# Patient Record
Sex: FEMALE | Race: WHITE | Employment: OTHER | URBAN - METROPOLITAN AREA
[De-identification: names, ages, dates, MRNs, and addresses within clinical notes are randomized per-mention and may not be internally consistent; named-entity substitution may affect disease eponyms.]

---

## 2017-11-02 ENCOUNTER — OFFICE VISIT (OUTPATIENT)
Dept: ORTHOPEDIC SURGERY | Age: 62
End: 2017-11-02

## 2017-11-02 VITALS
DIASTOLIC BLOOD PRESSURE: 69 MMHG | SYSTOLIC BLOOD PRESSURE: 146 MMHG | HEIGHT: 72 IN | TEMPERATURE: 98.3 F | OXYGEN SATURATION: 98 % | WEIGHT: 293 LBS | BODY MASS INDEX: 39.68 KG/M2 | RESPIRATION RATE: 18 BRPM | HEART RATE: 95 BPM

## 2017-11-02 DIAGNOSIS — Z98.890 HX OF LAMINECTOMY: ICD-10-CM

## 2017-11-02 DIAGNOSIS — M47.816 LUMBAR FACET ARTHROPATHY: ICD-10-CM

## 2017-11-02 DIAGNOSIS — M54.16 LUMBAR RADICULAR PAIN: Primary | ICD-10-CM

## 2017-11-02 DIAGNOSIS — M51.26 PROTRUDED LUMBAR DISC: ICD-10-CM

## 2017-11-02 RX ORDER — CARISOPRODOL 350 MG/1
TABLET ORAL
COMMUNITY
Start: 2017-10-31 | End: 2019-05-15

## 2017-11-02 RX ORDER — MELOXICAM 15 MG/1
TABLET ORAL
COMMUNITY
Start: 2017-08-12 | End: 2019-10-10

## 2017-11-02 RX ORDER — NAPROXEN AND ESOMEPRAZOLE MAGNESIUM 500; 20 MG/1; MG/1
TABLET, DELAYED RELEASE ORAL
COMMUNITY
End: 2019-04-11 | Stop reason: ALTCHOICE

## 2017-11-02 RX ORDER — HYDROCHLOROTHIAZIDE 25 MG/1
TABLET ORAL DAILY
COMMUNITY
Start: 2017-08-12 | End: 2020-02-07

## 2017-11-02 RX ORDER — GABAPENTIN 300 MG/1
CAPSULE ORAL
Qty: 90 CAP | Refills: 1 | Status: SHIPPED | OUTPATIENT
Start: 2017-11-02 | End: 2017-12-11 | Stop reason: SDUPTHER

## 2017-11-02 NOTE — PROGRESS NOTES
VORB ENTERED PER DR. Arnel Spencer AS DOCUMENTED ON BLUE SHEET: Gabapentin 300 mg Take 1 tab PO QHS x3 nights then increase BID x3d then TID Disp 90 1 RF

## 2017-11-02 NOTE — PATIENT INSTRUCTIONS
Back Care and Preventing Injuries: Care Instructions  Your Care Instructions    You can hurt your back doing many everyday activities: lifting a heavy box, bending down to garden, exercising at the gym, and even getting out of bed. But you can keep your back strong and healthy by doing some exercises. You also can follow a few tips for sitting, sleeping, and lifting to avoid hurting your back again. Talk to your doctor before you start an exercise program. Ask for help if you want to learn more about keeping your back healthy. Follow-up care is a key part of your treatment and safety. Be sure to make and go to all appointments, and call your doctor if you are having problems. It's also a good idea to know your test results and keep a list of the medicines you take. How can you care for yourself at home? · Stay at a healthy weight to avoid strain on your lower back. · Do not smoke. Smoking increases the risk of osteoporosis, which weakens the spine. If you need help quitting, talk to your doctor about stop-smoking programs and medicines. These can increase your chances of quitting for good. · Make sure you sleep in a position that maintains your back's normal curves and on a mattress that feels comfortable. Sleep on your side with a pillow between your knees, or sleep on your back with a pillow under your knees. These positions can reduce strain on your back. · When you get out of bed, lie on your side and bend both knees. Drop your feet over the edge of the bed as you push up with both arms. Scoot to the edge of the bed. Make sure your feet are in line with your rear end (buttocks), and then stand up. · If you must stand for a long time, put one foot on a stool, ledge, or box. Exercise to strengthen your back and other muscles  · Get at least 30 minutes of exercise on most days of the week. Walking is a good choice.  You also may want to do other activities, such as running, swimming, cycling, or playing tennis or team sports. · Stretch your back muscles. Here are few exercises to try:  Mia Jones on your back with your knees bent and your feet flat on the floor. Gently pull one bent knee to your chest. Put that foot back on the floor, and then pull the other knee to your chest. Hold for 15 to 30 seconds. Repeat 2 to 4 times. ¨ Do pelvic tilts. Lie on your back with your knees bent. Tighten your stomach muscles. Pull your belly button (navel) in and up toward your ribs. You should feel like your back is pressing to the floor and your hips and pelvis are slightly lifting off the floor. Hold for 6 seconds while breathing smoothly. · Keep your core muscles strong. The muscles of your back, belly (abdomen), and buttocks support your spine. ¨ Pull in your belly, and imagine pulling your navel toward your spine. Hold this for 6 seconds, then relax. Remember to keep breathing normally as you tense your muscles. ¨ Do curl-ups. Always do them with your knees bent. Keep your low back on the floor, and curl your shoulders toward your knees using a smooth, slow motion. Keep your arms folded across your chest. If this bothers your neck, try putting your hands behind your neck (not your head), with your elbows spread apart. ¨ Lie on your back with your knees bent and your feet flat on the floor. Tighten your belly muscles, and then push with your feet and raise your buttocks up a few inches. Hold this position 6 seconds as you continue to breathe normally, then lower yourself slowly to the floor. Repeat 8 to 12 times. ¨ If you like group exercise, try Pilates or yoga. These classes have poses that strengthen the core muscles. Protect your back when you sit  · Place a small pillow, a rolled-up towel, or a lumbar roll in the curve of your back if you need extra support. · Sit in a chair that is low enough to let you place both feet flat on the floor with both knees nearly level with your hips.  If your chair or desk is too high, use a foot rest to raise your knees. · When driving, keep your knees nearly level with your hips. Sit straight, and drive with both hands on the steering wheel. Your arms should be in a slightly bent position. · Try a kneeling chair, which helps tilt your hips forward. This takes pressure off your lower back. · Try sitting on an exercise ball. It can rock from side to side, which helps keep your back loose. Lift properly  · Squat down, bending at the hips and knees only. If you need to, put one knee to the floor and extend your other knee in front of you, bent at a right angle (half kneeling). · Press your chest straight forward. This helps keep your upper back straight while keeping a slight arch in your low back. · Hold the load as close to your body as possible, at the level of your navel. · Use your feet to change direction, taking small steps. · Lead with your hips as you change direction. Keep your shoulders in line with your hips as you move. Do not twist your body. · Set down your load carefully, squatting with your knees and hips only. When should you call for help? Watch closely for changes in your health, and be sure to contact your doctor if you have any problems. Where can you learn more? Go to http://jv-merly.info/. Enter S810 in the search box to learn more about \"Back Care and Preventing Injuries: Care Instructions. \"  Current as of: March 21, 2017  Content Version: 11.4  © 2547-2354 [employer]. Care instructions adapted under license by [employer] (which disclaims liability or warranty for this information). If you have questions about a medical condition or this instruction, always ask your healthcare professional. [place] [address] any warranty or liability for your use of this information.

## 2017-11-02 NOTE — PROGRESS NOTES
[facility] [address], [address]  [place]  Phone: [phone]  Fax: [phone]        Lexie Sanchez  : 1955  PCP: Walter Booker MD      NEW PATIENT      ASSESSMENT AND PLAN     Diagnoses and all orders for this visit:    1. Lumbar radicular pain, R>L    2. Protruded lumbar disc, LL5/S1    3. Lumbar facet arthropathy, L3/4    4. Hx of laminectomy, LL4/5 Dr. Daniel Crenshaw    Other orders  -     gabapentin (NEURONTIN) 300 mg capsule; Take 1 tab PO QHS x 3 nights then increase to BID x 3 days then TID       1. Advised to stay active as tolerated. 2. Discussed medications and cortisone injections as treatment options  3. Start Gabapentin 300mg ramp q3days; consider taper after several months  4. Continue rest of your medications. Be careful of increased swelling  5. Avoid combination of bending, twisting, lifting. 6. Information on avoiding back injury provided    Follow-up Disposition:  Return in about 4 weeks (around 2017). CHIEF COMPLAINT  Chadwick Meigs is seen today in consultation at the request of Dr. Radha Padilla for complaints of low back pain       HISTORY OF PRESENT ILLNESS  Chadwick Meigs is a 60 y.o. female. Today pt c/o low back pain of 6 weeks duration. Pt denies any specific incident or injury that caused their pain. Reports pain started on RIGHT side after prolonged sitting on a bar stool. Used MDP rx by rheumatologist, that relieved the pain significantly. She reports last dose MDP couple weeks ago. She notes worsening of pain in low back. She also notes some lateral leg pain bilaterally (LEFT>RIGHT) especially at night that has become intermittent, with neuropathic pain down into feet. She denies any known hx of neuropathic pain. She reports she has been having cortisone shots [id number] on her left knee. Pt admits to some weakness and heaviness with walking.  She reports only being able to walk for a few minutes before stopping. She reports having trouble standing at sink due to back pain. Pt denies saddle paresthesias. She has tried; PT-No,  Non-opioid medications Yes, and spinal injections No.  She reports the night-time symptoms and severity of her back pain are new. She also reports that she has had gabapentin in the past and was effective; she denies any grogginess. Denies persistent fevers, chills, weight changes, neurogenic bowel or bladder symptoms. Pt denies recent ED visits or hospitalizations. Has hx of back surgery by Dr. Chidi Grant in 2008 for ruptured disk. Recent MRI done and images reviewed with patient. Contralateral disc protrusion at L5/S1. Retired  - is currently substituting    PCP Valentine Hopkins        Pain Assessment  11/2/2017   Location of Pain Back   Severity of Pain 5   Quality of Pain Aching   Quality of Pain Comment numbness, tingling   Frequency of Pain Intermittent   Aggravating Factors Standing;Walking;Bending   Aggravating Factors Comment lifting, changing positions, Lying,sitting     MRI 10/04/2017 AT [facility]  IMPRESSION:   1. Status post left hemilaminectomy at L4-L5  2. Mild to moderate multilevel lumbar spondylosis. Mild spinal canal stenosis at L3-L4 due to disc bulge and severe facet arthropathy. Mild mass effect on the lateral recess at L3-L4 and possible impingement of the descending left L4 nerve root due to disc herniation and facet arthropathy  3. Mild left neural foraminal narrowing at L5-S1 due ot left foraminal disc osteophyte complex. Large far left lateral disc osteophyte complex at L5-S1 abuts and possible impinges the exiting post foraminal left L5 nerve root. 4. Mild to moderate degenerative change of the bialteral sacroiliac joints.     PAST MEDICAL HISTORY   Past Medical History:   Diagnosis Date    Arthritis     Chronic pain     Hypertension        Past Surgical History:   Procedure Laterality Date    HX GYN      HX LUMBAR LAMINECTOMY Left     Laminectomy L4/5 Dr. Moses Blanco about 2008       MEDICATIONS      Current Outpatient Prescriptions   Medication Sig Dispense Refill    carisoprodol (SOMA) 350 mg tablet       hydroCHLOROthiazide (HYDRODIURIL) 25 mg tablet       meloxicam (MOBIC) 15 mg tablet       Naproxen-Esomeprazole Mag (VIMOVO) 500-20 mg TbID Take  by mouth.  gabapentin (NEURONTIN) 300 mg capsule Take 1 tab PO QHS x 3 nights then increase to BID x 3 days then TID 90 Cap 1    simvastatin (ZOCOR) 40 mg tablet Take 40 mg by mouth nightly. Indications: HYPERLIPIDEMIA      metoprolol tartrate (LOPRESSOR) 25 mg tablet Take 25 mg by mouth daily. 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 Indications: HYPERTENSION         ALLERGIES    Allergies   Allergen Reactions    Indocin [Indomethacin] Rash          SOCIAL HISTORY    Social History     Social History    Marital status:      Spouse name: N/A    Number of children: N/A    Years of education: N/A     Occupational History    Not on file. Social History Main Topics    Smoking status: Former Smoker    Smokeless tobacco: Former User    Alcohol use Yes      Comment: Occasionally    Drug use: No    Sexual activity: Not on file     Other Topics Concern    Not on file     Social History Narrative    No narrative on file       FAMILY HISTORY    Family History   Problem Relation Age of Onset    Hypertension Mother     Hypertension Father     Stroke Father     Hypertension Sister     Cancer Sister     Hypertension Brother     Alcohol abuse Brother     Arthritis-osteo Brother     Heart Disease Brother          REVIEW OF SYSTEMS  Review of Systems   Constitutional: Negative for chills, fever and weight loss. Respiratory: Negative for shortness of breath. Cardiovascular: Negative for chest pain. Gastrointestinal: Negative for constipation. Negative for fecal incontinence   Genitourinary: Negative for dysuria. Negative for urinary incontinence   Musculoskeletal:        Per HPI   Skin: Negative for rash. Neurological: Positive for tingling. Negative for dizziness, tremors, focal weakness and headaches. Endo/Heme/Allergies: Does not bruise/bleed easily. Psychiatric/Behavioral: The patient does not have insomnia. PHYSICAL EXAMINATION  Visit Vitals    /69    Pulse 95    Temp 98.3 °F (36.8 °C) (Oral)    Resp 18    Ht 6' (1.829 m)    Wt 296 lb (134.3 kg)    SpO2 98%    BMI 40.14 kg/m2          Accompanied by self. Constitutional:  Well developed, well nourished, in no acute distress. Psychiatric: Affect and mood are appropriate. Integumentary: No rashes or abrasions noted on exposed areas. Cardiovascular/Peripheral Vascular: Intact l pulses. No peripheral edema is noted. Lymphatic:  1+ bilateral pitting edema. No cervical lymphadenopathy. SPINE/MUSCULOSKELETAL EXAM    Lumbar spine:  No rash, ecchymosis, or gross obliquity. No fasciculations. No focal atrophy is noted. Tenderness to palpation L4-L5 paraspinals bilaterally, Tenderness to palpation of midline lumbar spine. No tenderness to palpation at the sciatic notch. SI joints non-tender. Trochanters non tender. MOTOR:       Hip Flex  Quads Hamstrings Ankle DF EHL Ankle PF   Right +4/5 +4/5 +4/5 +4/5 +4/5 +4/5   Left +4/5 +4/5 +4/5 +4/5 +4/5 +4/5       Straight Leg raise negative. Toe rise unable to do due to loss of balance    Ambulation without assistive device. Wide base of support. Written by Edmundo Mariano, as dictated by Precious Syed MD.    I, Dr. Precious Syed MD, confirm that all documentation is accurate. Ms. Tarik Trevino may have a reminder for a \"due or due soon\" health maintenance. I have asked that she contact her primary care provider for follow-up on this health maintenance.

## 2017-11-02 NOTE — MR AVS SNAPSHOT
Visit Information Date & Time Provider Department Dept. Phone Encounter #  
 11/2/2017  9:30 AM Tarik Levi MD [facility] [phone] [id number] Follow-up Instructions Return in about 4 weeks (around 11/30/2017). Upcoming Health Maintenance Date Due Hepatitis C Screening 1955 DTaP/Tdap/Td series (1 - Tdap) 6/27/1975 PAP AKA CERVICAL CYTOLOGY 6/27/1975 BREAST CANCER SCRN MAMMOGRAM 6/27/2004 FOBT Q 1 YEAR AGE 50-75 6/27/2004 ZOSTER VACCINE AGE 60> 4/27/2014 INFLUENZA AGE 9 TO ADULT 8/1/2017 Allergies as of 11/2/2017  Review Complete On: 11/2/2017 By: Tarik Levi MD  
  
 Severity Noted Reaction Type Reactions Indocin [Indomethacin]  05/10/2016   Systemic Rash Current Immunizations  Never Reviewed No immunizations on file. Not reviewed this visit Vitals BP Pulse Temp Resp Height(growth percentile) Weight(growth percentile) 146/69 95 98.3 °F (36.8 °C) (Oral) 18 6' (1.829 m) 296 lb (134.3 kg) SpO2 BMI Smoking Status 98% 40.14 kg/m2 Former Smoker BMI and BSA Data Body Mass Index Body Surface Area  
 40.14 kg/m 2 2.61 m 2 Preferred Pharmacy Pharmacy Name Phone [address] [facility], [address] [place] [address] [phone] Your Updated Medication List  
  
   
This list is accurate as of: 11/2/17 10:43 AM.  Always use your most recent med list.  
  
  
  
  
 carisoprodol 350 mg tablet Commonly known as:  SOMA  
  
 gabapentin 300 mg capsule Commonly known as:  NEURONTIN Take 1 tab PO QHS x 3 nights then increase to BID x 3 days then TID  
  
 hydroCHLOROthiazide 25 mg tablet Commonly known as:  HYDRODIURIL  
  
 lisinopril 10 mg tablet Commonly known as:  Tomasz Economy Take 10 mg by mouth daily. Indications: HYPERTENSION  
  
 meloxicam 15 mg tablet Commonly known as:  MOBIC  
  
 metoprolol tartrate 25 mg tablet Commonly known as:  LOPRESSOR Take 25 mg by mouth daily. 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  
  
 simvastatin 40 mg tablet Commonly known as:  ZOCOR Take 40 mg by mouth nightly. Indications: HYPERLIPIDEMIA  
  
 VIMOVO 500-20 mg Tbid Generic drug:  Naproxen-Esomeprazole Mag Take  by mouth. Prescriptions Printed Refills  
 gabapentin (NEURONTIN) 300 mg capsule 1 Sig: Take 1 tab PO QHS x 3 nights then increase to BID x 3 days then TID Class: Print Follow-up Instructions Return in about 4 weeks (around 11/30/2017). Patient Instructions Back Care and Preventing Injuries: Care Instructions Your Care Instructions You can hurt your back doing many everyday activities: lifting a heavy box, bending down to garden, exercising at the gym, and even getting out of bed. But you can keep your back strong and healthy by doing some exercises. You also can follow a few tips for sitting, sleeping, and lifting to avoid hurting your back again. Talk to your doctor before you start an exercise program. Ask for help if you want to learn more about keeping your back healthy. Follow-up care is a key part of your treatment and safety. Be sure to make and go to all appointments, and call your doctor if you are having problems. It's also a good idea to know your test results and keep a list of the medicines you take. How can you care for yourself at home? · Stay at a healthy weight to avoid strain on your lower back. · Do not smoke. Smoking increases the risk of osteoporosis, which weakens the spine. If you need help quitting, talk to your doctor about stop-smoking programs and medicines. These can increase your chances of quitting for good.  
· Make sure you sleep in a position that maintains your back's normal curves and on a mattress that feels comfortable. Sleep on your side with a pillow between your knees, or sleep on your back with a pillow under your knees. These positions can reduce strain on your back. · When you get out of bed, lie on your side and bend both knees. Drop your feet over the edge of the bed as you push up with both arms. Scoot to the edge of the bed. Make sure your feet are in line with your rear end (buttocks), and then stand up. · If you must stand for a long time, put one foot on a stool, ledge, or box. Exercise to strengthen your back and other muscles · Get at least 30 minutes of exercise on most days of the week. Walking is a good choice. You also may want to do other activities, such as running, swimming, cycling, or playing tennis or team sports. · Stretch your back muscles. Here are few exercises to try: ¨ Lie on your back with your knees bent and your feet flat on the floor. Gently pull one bent knee to your chest. Put that foot back on the floor, and then pull the other knee to your chest. Hold for 15 to 30 seconds. Repeat 2 to 4 times. ¨ Do pelvic tilts. Lie on your back with your knees bent. Tighten your stomach muscles. Pull your belly button (navel) in and up toward your ribs. You should feel like your back is pressing to the floor and your hips and pelvis are slightly lifting off the floor. Hold for 6 seconds while breathing smoothly. · Keep your core muscles strong. The muscles of your back, belly (abdomen), and buttocks support your spine. ¨ Pull in your belly, and imagine pulling your navel toward your spine. Hold this for 6 seconds, then relax. Remember to keep breathing normally as you tense your muscles. ¨ Do curl-ups. Always do them with your knees bent. Keep your low back on the floor, and curl your shoulders toward your knees using a smooth, slow motion.  Keep your arms folded across your chest. If this bothers your neck, try putting your hands behind your neck (not your head), with your elbows spread apart. ¨ Lie on your back with your knees bent and your feet flat on the floor. Tighten your belly muscles, and then push with your feet and raise your buttocks up a few inches. Hold this position 6 seconds as you continue to breathe normally, then lower yourself slowly to the floor. Repeat 8 to 12 times. ¨ If you like group exercise, try Pilates or yoga. These classes have poses that strengthen the core muscles. Protect your back when you sit · Place a small pillow, a rolled-up towel, or a lumbar roll in the curve of your back if you need extra support. · Sit in a chair that is low enough to let you place both feet flat on the floor with both knees nearly level with your hips. If your chair or desk is too high, use a foot rest to raise your knees. · When driving, keep your knees nearly level with your hips. Sit straight, and drive with both hands on the steering wheel. Your arms should be in a slightly bent position. · Try a kneeling chair, which helps tilt your hips forward. This takes pressure off your lower back. · Try sitting on an exercise ball. It can rock from side to side, which helps keep your back loose. Lift properly · Squat down, bending at the hips and knees only. If you need to, put one knee to the floor and extend your other knee in front of you, bent at a right angle (half kneeling). · Press your chest straight forward. This helps keep your upper back straight while keeping a slight arch in your low back. · Hold the load as close to your body as possible, at the level of your navel. · Use your feet to change direction, taking small steps. · Lead with your hips as you change direction. Keep your shoulders in line with your hips as you move. Do not twist your body. · Set down your load carefully, squatting with your knees and hips only. When should you call for help? Watch closely for changes in your health, and be sure to contact your doctor if you have any problems. Where can you learn more? Go to http://jv-merly.info/. Enter S810 in the search box to learn more about \"Back Care and Preventing Injuries: Care Instructions. \" Current as of: March 21, 2017 Content Version: 11.4 © 5373-1706 [employer]. Care instructions adapted under license by [employer] (which disclaims liability or warranty for this information). If you have questions about a medical condition or this instruction, always ask your healthcare professional. [address] any warranty or liability for your use of this information. Introducing [facility]! Blanca Mathews introduces Naytev patient portal. Now you can access parts of your medical record, email your doctor's office, and request medication refills online. 1. In your internet browser, go to [email or web address] 2. Click on the First Time User? Click Here link in the Sign In box. You will see the New Member Sign Up page. 3. Enter your Naytev Access Code exactly as it appears below. You will not need to use this code after youve completed the sign-up process. If you do not sign up before the expiration date, you must request a new code. · Naytev Access Code: [email or web address] Expires: 1/31/2018  8:49 AM 
 
4. Enter the last four digits of your Social Security Number (xxxx) and Date of Birth (mm/dd/yyyy) as indicated and click Submit. You will be taken to the next sign-up page. 5. Create a Quisict ID. This will be your Naytev login ID and cannot be changed, so think of one that is secure and easy to remember. 6. Create a Naytev password. You can change your password at any time. 7. Enter your Password Reset Question and Answer. This can be used at a later time if you forget your password. 8. Enter your e-mail address. You will receive e-mail notification when new information is available in [address] 9. Click Sign Up. You can now view and download portions of your medical record. 10. Click the Download Summary menu link to download a portable copy of your medical information. If you have questions, please visit the Frequently Asked Questions section of the [employer] website. Remember, [employer] is NOT to be used for urgent needs. For medical emergencies, dial 911. Now available from your iPhone and Android! Please provide this summary of care documentation to your next provider. Your primary care clinician is listed as Phys Other. If you have any questions after today's visit, please call [phone].

## 2017-11-03 PROBLEM — M54.16 LUMBAR RADICULAR PAIN: Status: ACTIVE | Noted: 2017-11-03

## 2017-11-03 PROBLEM — M51.26 PROTRUDED LUMBAR DISC: Status: ACTIVE | Noted: 2017-11-03

## 2017-11-03 PROBLEM — M47.816 LUMBAR FACET ARTHROPATHY: Status: ACTIVE | Noted: 2017-11-03

## 2017-11-03 PROBLEM — Z98.890 HX OF LAMINECTOMY: Status: ACTIVE | Noted: 2017-11-03

## 2017-11-16 ENCOUNTER — TELEPHONE (OUTPATIENT)
Dept: ORTHOPEDIC SURGERY | Age: 62
End: 2017-11-16

## 2017-11-16 NOTE — TELEPHONE ENCOUNTER
Patient called in states that she needs her office note from 11/02/17 faxed over to her Rheumatologist Ling Ramos. She has a telephone number but no fax. Tel#[phone].  Patient also states she has signed medical release already and can be reached at [phone]

## 2017-12-11 RX ORDER — CARISOPRODOL 350 MG/1
TABLET ORAL
Status: CANCELLED | OUTPATIENT
Start: 2017-12-11

## 2017-12-11 RX ORDER — GABAPENTIN 300 MG/1
300 CAPSULE ORAL 3 TIMES DAILY
Qty: 270 CAP | Refills: 0 | Status: SHIPPED | OUTPATIENT
Start: 2017-12-11 | End: 2018-02-12 | Stop reason: SDUPTHER

## 2017-12-11 NOTE — TELEPHONE ENCOUNTER
The patient requests 90 day prescription for Neurontin and Soma to be filled using [facility]. Please advise. Last Visit: 11/02/2017 with MD Sunil Palafox    Next Appointment: 12/14/2017 with MD Barber Most     Requested Prescriptions     Pending Prescriptions Disp Refills    gabapentin (NEURONTIN) 300 mg capsule 270 Cap 0     Sig: Take 1 Cap by mouth three (3) times daily.     carisoprodol (SOMA) 350 mg tablet

## 2017-12-14 ENCOUNTER — OFFICE VISIT (OUTPATIENT)
Dept: ORTHOPEDIC SURGERY | Age: 62
End: 2017-12-14

## 2017-12-14 VITALS
WEIGHT: 293 LBS | TEMPERATURE: 98.4 F | DIASTOLIC BLOOD PRESSURE: 59 MMHG | HEIGHT: 72 IN | SYSTOLIC BLOOD PRESSURE: 129 MMHG | BODY MASS INDEX: 39.68 KG/M2 | OXYGEN SATURATION: 96 % | HEART RATE: 83 BPM | RESPIRATION RATE: 18 BRPM

## 2017-12-14 DIAGNOSIS — M54.16 LUMBAR RADICULAR PAIN: ICD-10-CM

## 2017-12-14 DIAGNOSIS — M47.816 LUMBAR FACET ARTHROPATHY: Primary | ICD-10-CM

## 2017-12-14 DIAGNOSIS — Z98.890 HX OF LAMINECTOMY: ICD-10-CM

## 2017-12-14 PROBLEM — E66.01 OBESITY, MORBID (HCC): Status: ACTIVE | Noted: 2017-12-14

## 2017-12-14 RX ORDER — METHYLPREDNISOLONE 4 MG/1
TABLET ORAL
Refills: 0 | COMMUNITY
Start: 2017-09-07 | End: 2018-08-01 | Stop reason: ALTCHOICE

## 2017-12-14 RX ORDER — CYCLOBENZAPRINE HCL 10 MG
TABLET ORAL
Refills: 0 | COMMUNITY
Start: 2017-10-06 | End: 2019-05-15

## 2017-12-14 RX ORDER — SODIUM PICOSULFATE, MAGNESIUM OXIDE, AND ANHYDROUS CITRIC ACID 10; 3.5; 12 MG/16.1G; G/16.1G; G/16.1G
POWDER, METERED ORAL
Refills: 0 | COMMUNITY
Start: 2017-09-18 | End: 2019-10-10

## 2017-12-14 RX ORDER — AMITRIPTYLINE HYDROCHLORIDE 10 MG/1
TABLET, FILM COATED ORAL
Qty: 60 TAB | Refills: 0 | Status: SHIPPED | OUTPATIENT
Start: 2017-12-14 | End: 2018-01-19 | Stop reason: SDUPTHER

## 2017-12-14 RX ORDER — PREDNISONE 10 MG/1
TABLET ORAL
Refills: 0 | COMMUNITY
Start: 2017-09-25 | End: 2019-10-10

## 2017-12-14 RX ORDER — METHYLPREDNISOLONE 4 MG/1
TABLET ORAL
Qty: 1 DOSE PACK | Refills: 0 | Status: SHIPPED | OUTPATIENT
Start: 2017-12-14 | End: 2018-08-01 | Stop reason: ALTCHOICE

## 2017-12-14 RX ORDER — HYDROCODONE BITARTRATE AND ACETAMINOPHEN 5; 325 MG/1; MG/1
TABLET ORAL
Refills: 0 | COMMUNITY
Start: 2017-10-12 | End: 2019-10-10

## 2017-12-14 NOTE — PROGRESS NOTES
[address], [address]  [place]  Phone: [phone]  Fax: [phone]        Hai Espinoza  : 1955  PCP: Gabriella Major MD    PROGRESS NOTE      ASSESSMENT AND PLAN    Diagnoses and all orders for this visit:    1. Lumbar facet arthropathy, L3/4    2. Lumbar radicular pain, left    3. Hx of laminectomy, LL4/5 Dr. Sterling Jerome    Other orders  -     methylPREDNISolone (MEDROL DOSEPACK) 4 mg tablet; Take as directed. -     amitriptyline (ELAVIL) 10 mg tablet; Take 1 tab PO QHS X3 nights then increase to 2 tabs PO QHS    1. Advised to continue HEP. 2. Rx for MDP. 3. DC Gabapentin. Take two tabs QHS x 3 days then decrease to 1 tab QHS x 3 days then stop. 4. Trial of Elavil. Follow-up Disposition:  Return in about 6 weeks (around 2018). HISTORY OF PRESENT ILLNESS  Lucia Packer is a 60 y.o. female. Pt presents to the office for a f/u visit for back pain. She was given a trial of Gabapentin at last OV. She was having good relief from her Gabapentin trial initially but is starting to have decrease of benefit from Gabapentin as she is starting to feel pain into her LLE into her ankle. If she lays on her back at night she has RLE and LLE pain. Her pain is usually felt in the LLE. She has a very short standing and walking tolerance. She begins to feel \"shaky\" when standing in stationary position. She tries to walk to her mailbox every day but has radiating pain with this. Pt has weakness in LLE. Pt denies saddle paresthesias. Pt states she has been using Gabapentin 900 mg QHS without much relief. Denies persistent fevers, chills, weight changes, neurogenic bowel or bladder symptoms. Pt denies recent ED visits or hospitalizations. She states that the weakness in her LLE is chronic, had foot drop before her surgery. Pt had L L4-5 laminectomy by Dr. Sterling Jerome in .  Had to have blood patch post spinal injection in the past.     Pain Assessment  12/14/2017   Location of Pain Back   Severity of Pain 3   Quality of Pain Aching   Quality of Pain Comment numbness, tingling   Frequency of Pain Constant   Aggravating Factors (No Data)   Aggravating Factors Comment moving around, sitting/lying down to long   Relieving Factors (No Data)   Relieving Factors Comment Muscle Relaxer       PAST MEDICAL HISTORY   Past Medical History:   Diagnosis Date    Arthritis     Chronic pain     Hypertension        Past Surgical History:   Procedure Laterality Date    HX GYN      HX LUMBAR LAMINECTOMY Left     Laminectomy L4/5 Dr. Fiona Acosta about 2008   . MEDICATIONS    Current Outpatient Prescriptions   Medication Sig Dispense Refill    cyclobenzaprine (FLEXERIL) 10 mg tablet TK 1/2 TO 1 T PO HS PRN  0    HYDROcodone-acetaminophen (NORCO) 5-325 mg per tablet TK 1 T PO BID PRN  0    methylPREDNISolone (MEDROL DOSEPACK) 4 mg tablet TK UTD WITH FOOD FOR 6 DAYS  0    predniSONE (DELTASONE) 10 mg tablet   0    PREPOPIK 10 mg-3.5 gram-12 gram pwpk USE AS DIRECTED  0    carisoprodol (SOMA) 350 mg tablet       hydroCHLOROthiazide (HYDRODIURIL) 25 mg tablet       meloxicam (MOBIC) 15 mg tablet       Naproxen-Esomeprazole Mag (VIMOVO) 500-20 mg TbID Take  by mouth.  simvastatin (ZOCOR) 40 mg tablet Take 40 mg by mouth nightly. Indications: HYPERLIPIDEMIA      metoprolol tartrate (LOPRESSOR) 25 mg tablet Take 25 mg by mouth daily. 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 Indications: HYPERTENSION      gabapentin (NEURONTIN) 300 mg capsule Take 1 Cap by mouth three (3) times daily.  270 Cap 0        ALLERGIES  Allergies   Allergen Reactions    Indocin [Indomethacin] Rash          SOCIAL HISTORY    Social History     Social History    Marital status:      Spouse name: N/A    Number of children: N/A    Years of education: N/A Occupational History    Not on file. Social History Main Topics    Smoking status: Former Smoker    Smokeless tobacco: Former User    Alcohol use Yes      Comment: Occasionally    Drug use: No    Sexual activity: Not on file     Other Topics Concern    Not on file     Social History Narrative       FAMILY HISTORY  Family History   Problem Relation Age of Onset    Hypertension Mother     Hypertension Father     Stroke Father     Hypertension Sister     Cancer Sister     Hypertension Brother     Alcohol abuse Brother     Arthritis-osteo Brother     Heart Disease Brother        REVIEW OF SYSTEMS  Review of Systems   Constitutional: Negative for chills, fever and weight loss. Respiratory: Negative for shortness of breath. Cardiovascular: Negative for chest pain. Gastrointestinal: Negative for constipation. Negative for fecal incontinence   Genitourinary: Negative for dysuria. Negative for urinary incontinence   Musculoskeletal:        Per HPI   Skin: Negative for rash. Neurological: Positive for focal weakness. Negative for dizziness, tingling, tremors and headaches. Endo/Heme/Allergies: Does not bruise/bleed easily. Psychiatric/Behavioral: The patient does not have insomnia. PHYSICAL EXAMINATION  Visit Vitals    /59    Pulse 83    Temp 98.4 °F (36.9 °C) (Oral)    Resp 18    Ht 6' (1.829 m)    Wt 295 lb 12.8 oz (134.2 kg)    SpO2 96%    BMI 40.12 kg/m2         Accompanied by self. Constitutional:  Well developed, well nourished, in no acute distress. Psychiatric: Affect and mood are appropriate. Integumentary: No rashes or abrasions noted on exposed areas. Cardiovascular/Peripheral Vascular: Intact l pulses. No peripheral edema is noted. Lymphatic:  No evidence of lymphedema. No cervical lymphadenopathy. SPINE/MUSCULOSKELETAL EXAM      Lumbar spine:  No rash, ecchymosis, or gross obliquity. No fasciculations.  No focal atrophy is noted.   Tenderness to palpation R L4-5. Tenderness to palpation of L L5-S1. No tenderness to palpation at the sciatic notch. SI joints non-tender. Trochanters non tender. MOTOR:       Hip Flex  Quads Hamstrings Ankle DF EHL Ankle PF   Right +4/5 +4/5 +4/5 +4/5 +4/5 +4/5   Left +4/5 +4/5 +4/5 4/5 +4/5 +4/5     Straight leg raise is + L 90 degrees. Ambulation without assistive device. FWB. Written by Yecenia Sosa, as dictated by Louise Anderson MD.    I, Dr. Louise Anderson MD, confirm that all documentation is accurate. Ms. Nacho Zavala may have a reminder for a \"due or due soon\" health maintenance. I have asked that she contact her primary care provider for follow-up on this health maintenance.

## 2017-12-14 NOTE — MR AVS SNAPSHOT
Visit Information Date & Time Provider Department Dept. Phone Encounter #  
 12/14/2017  1:15 PM Ajay Edmonds MD [facility] [address] [facility] [phone] [id number] Follow-up Instructions Return in about 1 month (around 1/14/2018). Upcoming Health Maintenance Date Due Hepatitis C Screening 1955 DTaP/Tdap/Td series (1 - Tdap) 6/27/1975 PAP AKA CERVICAL CYTOLOGY 6/27/1975 FOBT Q 1 YEAR AGE 50-75 6/27/2004 ZOSTER VACCINE AGE 60> 4/27/2014 Influenza Age 5 to Adult 8/1/2017 Allergies as of 12/14/2017  Review Complete On: 12/14/2017 By: Herve Mckeon LPN Severity Noted Reaction Type Reactions Indocin [Indomethacin]  05/10/2016   Systemic Rash Current Immunizations  Never Reviewed No immunizations on file. Not reviewed this visit You Were Diagnosed With   
  
 Codes Comments Lumbar facet arthropathy    -  Primary ICD-10-CM: M12.88 ICD-9-CM: 721.3 Lumbar radicular pain     ICD-10-CM: M54.16 
ICD-9-CM: 724.4 Hx of laminectomy     ICD-10-CM: Z98.890 ICD-9-CM: V45.89 Vitals BP Pulse Temp Resp Height(growth percentile) Weight(growth percentile) 129/59 83 98.4 °F (36.9 °C) (Oral) 18 6' (1.829 m) 295 lb 12.8 oz (134.2 kg) SpO2 BMI Smoking Status 96% 40.12 kg/m2 Former Smoker BMI and BSA Data Body Mass Index Body Surface Area  
 40.12 kg/m 2 2.61 m 2 Preferred Pharmacy Pharmacy Name [address] [facility] [phone] Your Updated Medication List  
  
   
This list is accurate as of: 12/14/17  2:29 PM.  Always use your most recent med list.  
  
  
  
  
 carisoprodol 350 mg tablet Commonly known as:  SOMA cyclobenzaprine 10 mg tablet Commonly known as:  FLEXERIL TK 1/2 TO 1 T PO HS PRN  
  
 gabapentin 300 mg capsule Commonly known as:  NEURONTIN  
 Take 1 Cap by mouth three (3) times daily. hydroCHLOROthiazide 25 mg tablet Commonly known as:  HYDRODIURIL HYDROcodone-acetaminophen 5-325 mg per tablet Commonly known as:  [address] TK 1 T PO BID PRN  
  
 lisinopril 10 mg tablet Commonly known as:  Merilynn [place] Take 10 mg by mouth daily. Indications: HYPERTENSION  
  
 meloxicam 15 mg tablet Commonly known as:  MOBIC  
  
 methylPREDNISolone 4 mg tablet Commonly known as:  Harmony Oh TK UTD WITH FOOD FOR 6 DAYS  
  
 metoprolol tartrate 25 mg tablet Commonly known as:  LOPRESSOR Take 25 mg by mouth daily. 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  
  
 predniSONE 10 mg tablet Commonly known as:  Lexie  10 mg-3.5 gram-12 gram Pwpk Generic drug:  sod picosulf-mag ox-citric ac USE AS DIRECTED  
  
 simvastatin 40 mg tablet Commonly known as:  ZOCOR Take 40 mg by mouth nightly. Indications: HYPERLIPIDEMIA  
  
 VIMOVO 500-20 mg Tbid Generic drug:  Naproxen-Esomeprazole Mag Take  by mouth. Follow-up Instructions Return in about 1 month (around 1/14/2018). Patient Instructions Stopping Gabapentin: 
Starting today:Take two tabs at bedtime x 3 days then decrease to 1 tab QHS x 3 days then stop. Sciatica: Care Instructions Your Care Instructions Sciatica (say \"ldj-VA-xe-kuh\") is an irritation of one of the sciatic nerves, which come from the spinal cord in the lower back. The sciatic nerves and their branches extend down through the buttock to the foot. Sciatica can develop when an injured disc in the back presses against a spinal nerve root. Its main symptom is pain, numbness, or weakness that is often worse in the leg or foot than in the back. Sciatica often will improve and go away with time. Early treatment usually includes medicines and exercises to relieve pain. Follow-up care is a key part of your treatment and safety. Be sure to make and go to all appointments, and call your doctor if you are having problems. It's also a good idea to know your test results and keep a list of the medicines you take. How can you care for yourself at home? · Take pain medicines exactly as directed. ¨ If the doctor gave you a prescription medicine for pain, take it as prescribed. ¨ If you are not taking a prescription pain medicine, ask your doctor if you can take an over-the-counter medicine. · Use heat or ice to relieve pain. ¨ To apply heat, put a warm water bottle, heating pad set on low, or warm cloth on your back. Do not go to sleep with a heating pad on your skin. ¨ To use ice, put ice or a cold pack on the area for 10 to 20 minutes at a time. Put a thin cloth between the ice and your skin. · Avoid sitting if possible, unless it feels better than standing. · Alternate lying down with short walks. Increase your walking distance as you are able to without making your symptoms worse. · Do not do anything that makes your symptoms worse. When should you call for help? Call 911 anytime you think you may need emergency care. For example, call if: 
· You are unable to move a leg at all. Call your doctor now or seek immediate medical care if: 
· You have new or worse symptoms in your legs or buttocks. Symptoms may include: ¨ Numbness or tingling. ¨ Weakness. ¨ Pain. · You lose bladder or bowel control. Watch closely for changes in your health, and be sure to contact your doctor if: 
· You are not getting better as expected. Where can you learn more? Go to http://jv-merly.info/. Enter [phone] in the search box to learn more about \"Sciatica: Care Instructions. \" Current as of: March 21, 2017 Content Version: 11.4 © 9999-0353 Healthwise, vSocial.  Care instructions adapted under license by [employer] (which disclaims liability or warranty for this information). If you have questions about a medical condition or this instruction, always ask your healthcare professional. [address] any warranty or liability for your use of this information. Introducing [facility]! [facility] introduces Cobook patient portal. Now you can access parts of your medical record, email your doctor's office, and request medication refills online. 1. In your internet browser, go to [email or web address] 2. Click on the First Time User? Click Here link in the Sign In box. You will see the New Member Sign Up page. 3. Enter your Cobook Access Code exactly as it appears below. You will not need to use this code after youve completed the sign-up process. If you do not sign up before the expiration date, you must request a new code. · Cobook Access Code: [email or web address] Expires: 1/31/2018  7:49 AM 
 
4. Enter the last four digits of your Social Security Number (xxxx) and Date of Birth (mm/dd/yyyy) as indicated and click Submit. You will be taken to the next sign-up page. 5. Create a Cobook ID. This will be your Cobook login ID and cannot be changed, so think of one that is secure and easy to remember. 6. Create a Cobook password. You can change your password at any time. 7. Enter your Password Reset Question and Answer. This can be used at a later time if you forget your password. 8. Enter your e-mail address. You will receive e-mail notification when new information is available in [address] 9. Click Sign Up. You can now view and download portions of your medical record. 10. Click the Download Summary menu link to download a portable copy of your medical information. If you have questions, please visit the Frequently Asked Questions section of the Cobook website. Remember, Cobook is NOT to be used for urgent needs. For medical emergencies, dial 911. Now available from your iPhone and Android! Please provide this summary of care documentation to your next provider. Your primary care clinician is listed as [employer]. If you have any questions after today's visit, please call [phone].

## 2017-12-14 NOTE — PROGRESS NOTES
VORB ENTERED PER DR. Melissa Case AS DOCUMENTED ON BLUE SHEET: MDP take as directed, Elavil 10 mg Take 1 tab PO QHS x3 nights then increase to 2 tabs PO QHS Disp 60 0 RF

## 2017-12-14 NOTE — PATIENT INSTRUCTIONS
Stopping Gabapentin:  Starting today:Take two tabs at bedtime x 3 days then decrease to 1 tab QHS x 3 days then stop. Sciatica: Care Instructions  Your Care Instructions    Sciatica (say \"syp-CY-ho-kuh\") is an irritation of one of the sciatic nerves, which come from the spinal cord in the lower back. The sciatic nerves and their branches extend down through the buttock to the foot. Sciatica can develop when an injured disc in the back presses against a spinal nerve root. Its main symptom is pain, numbness, or weakness that is often worse in the leg or foot than in the back. Sciatica often will improve and go away with time. Early treatment usually includes medicines and exercises to relieve pain. Follow-up care is a key part of your treatment and safety. Be sure to make and go to all appointments, and call your doctor if you are having problems. It's also a good idea to know your test results and keep a list of the medicines you take. How can you care for yourself at home? · Take pain medicines exactly as directed. ¨ If the doctor gave you a prescription medicine for pain, take it as prescribed. ¨ If you are not taking a prescription pain medicine, ask your doctor if you can take an over-the-counter medicine. · Use heat or ice to relieve pain. ¨ To apply heat, put a warm water bottle, heating pad set on low, or warm cloth on your back. Do not go to sleep with a heating pad on your skin. ¨ To use ice, put ice or a cold pack on the area for 10 to 20 minutes at a time. Put a thin cloth between the ice and your skin. · Avoid sitting if possible, unless it feels better than standing. · Alternate lying down with short walks. Increase your walking distance as you are able to without making your symptoms worse. · Do not do anything that makes your symptoms worse. When should you call for help? Call 911 anytime you think you may need emergency care.  For example, call if:  · You are unable to move a leg at all. Call your doctor now or seek immediate medical care if:  · You have new or worse symptoms in your legs or buttocks. Symptoms may include:  ¨ Numbness or tingling. ¨ Weakness. ¨ Pain. · You lose bladder or bowel control. Watch closely for changes in your health, and be sure to contact your doctor if:  · You are not getting better as expected. Where can you learn more? Go to http://jv-merly.info/. Enter [phone] in the search box to learn more about \"Sciatica: Care Instructions. \"  Current as of: March 21, 2017  Content Version: 11.4  © 1641-5140 [employer]. Care instructions adapted under license by [employer] (which disclaims liability or warranty for this information). If you have questions about a medical condition or this instruction, always ask your healthcare professional. [address] any warranty or liability for your use of this information.

## 2018-01-19 RX ORDER — AMITRIPTYLINE HYDROCHLORIDE 10 MG/1
20 TABLET, FILM COATED ORAL
Qty: 60 TAB | Refills: 0 | Status: SHIPPED | OUTPATIENT
Start: 2018-01-19 | End: 2018-02-12 | Stop reason: SDUPTHER

## 2018-01-19 NOTE — TELEPHONE ENCOUNTER
Last Visit: 12/14/2017 with MD Michelle Luna    Next Appointment: 02/12/2018 with MD Michelle Luna; 01/18 appt canceled-weather related   Previous Refill Encounters: 12/14/2017 per MD Michelle Luna #[phone]     Requested Prescriptions     Pending Prescriptions Disp Refills    amitriptyline (ELAVIL) 10 mg tablet 60 Tab 0     Sig: Take 2 Tabs by mouth nightly.

## 2018-02-12 ENCOUNTER — OFFICE VISIT (OUTPATIENT)
Dept: ORTHOPEDIC SURGERY | Age: 63
End: 2018-02-12

## 2018-02-12 VITALS
HEART RATE: 113 BPM | RESPIRATION RATE: 18 BRPM | TEMPERATURE: 98.5 F | BODY MASS INDEX: 39.68 KG/M2 | SYSTOLIC BLOOD PRESSURE: 126 MMHG | DIASTOLIC BLOOD PRESSURE: 65 MMHG | HEIGHT: 72 IN | WEIGHT: 293 LBS | OXYGEN SATURATION: 97 %

## 2018-02-12 DIAGNOSIS — M48.07 STENOSIS OF LATERAL RECESS OF LUMBOSACRAL SPINE: ICD-10-CM

## 2018-02-12 DIAGNOSIS — Z98.890 HX OF LAMINECTOMY: Primary | ICD-10-CM

## 2018-02-12 RX ORDER — AMITRIPTYLINE HYDROCHLORIDE 10 MG/1
20 TABLET, FILM COATED ORAL
Qty: 180 TAB | Refills: 1 | Status: SHIPPED | OUTPATIENT
Start: 2018-02-18 | End: 2019-04-11

## 2018-02-12 RX ORDER — METOPROLOL SUCCINATE 25 MG/1
TABLET, EXTENDED RELEASE ORAL
Refills: 2 | COMMUNITY
Start: 2018-01-19

## 2018-02-12 RX ORDER — GABAPENTIN 300 MG/1
300 CAPSULE ORAL 3 TIMES DAILY
Qty: 270 CAP | Refills: 1 | Status: SHIPPED | OUTPATIENT
Start: 2018-02-12 | End: 2019-04-11 | Stop reason: SDUPTHER

## 2018-02-12 RX ORDER — LISINOPRIL 5 MG/1
TABLET ORAL
Refills: 0 | COMMUNITY
Start: 2018-01-19

## 2018-02-12 NOTE — MR AVS SNAPSHOT
[address] [address] [address] [place] [address] 
[phone] Patient: Huy Sanchez MRN: [id number] :[id number] Visit Information Date & Time Provider Department Dept. Phone Encounter #  
 2018  3:00  [address], MD 4 [facility], [address] [facility] [phone] [id number] Follow-up Instructions Return in about 6 months (around 2018). Upcoming Health Maintenance Date Due Hepatitis C Screening 1955 DTaP/Tdap/Td series (1 - Tdap) 1975 PAP AKA CERVICAL CYTOLOGY 1975 BREAST CANCER SCRN MAMMOGRAM 2004 FOBT Q 1 YEAR AGE 50-75 2004 ZOSTER VACCINE AGE 60> 2014 Influenza Age 5 to Adult 2017 Allergies as of 2018  Review Complete On: 2018 By: Josee Steinberg LPN Severity Noted Reaction Type Reactions Indocin [Indomethacin]  05/10/2016   Systemic Rash Current Immunizations  Never Reviewed No immunizations on file. Not reviewed this visit You Were Diagnosed With   
  
 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 
ICD-9-CM: 724.02 Vitals BP Pulse Temp Resp Height(growth percentile) Weight(growth percentile) 126/65 (!) 113 98.5 °F (36.9 °C) (Oral) 18 6' (1.829 m) 299 lb 3.2 oz (135.7 kg) SpO2 BMI Smoking Status 97% 40.58 kg/m2 Former Smoker BMI and BSA Data Body Mass Index Body Surface Area 40.58 kg/m 2 2.63 m 2 Preferred Pharmacy Pharmacy Name Phone [address], [address] [place] [address]  [phone] Your Updated Medication List  
  
   
This list is accurate as of: 18  4:00 PM.  Always use your most recent med list.  
  
  
  
  
 amitriptyline 10 mg tablet Commonly known as:  ELAVIL Take 2 Tabs by mouth nightly. Indications: NEUROPATHIC PAIN Start taking on:  2/18/2018  
  
 carisoprodol 350 mg tablet Commonly known as:  SOMA cyclobenzaprine 10 mg tablet Commonly known as:  FLEXERIL TK 1/2 TO 1 T PO HS PRN  
  
 gabapentin 300 mg capsule Commonly known as:  NEURONTIN Take 1 Cap by mouth three (3) times daily. hydroCHLOROthiazide 25 mg tablet Commonly known as:  HYDRODIURIL HYDROcodone-acetaminophen 5-325 mg per tablet Commonly known as:  [place] Boast TK 1 T PO BID PRN  
  
 * lisinopril 10 mg tablet Commonly known as:  Marlane Janneth Take 10 mg by mouth daily. Indications: HYPERTENSION  
  
 * lisinopril 5 mg tablet Commonly known as:  Marlane Janneth TK 1 T PO QD  
  
 meloxicam 15 mg tablet Commonly known as:  MOBIC  
  
 * methylPREDNISolone 4 mg tablet Commonly known as:  Terie Modest TK UTD WITH FOOD FOR 6 DAYS  
  
 * methylPREDNISolone 4 mg tablet Commonly known as:  Terie Modest Take as directed. metoprolol succinate 25 mg XL tablet Commonly known as:  TOPROL-XL TK 1 T PO QD  
  
 metoprolol tartrate 25 mg tablet Commonly known as:  LOPRESSOR Take 25 mg by mouth daily. 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  
  
 predniSONE 10 mg tablet Commonly known as:  Brice Sick 10 mg-3.5 gram-12 gram Pwpk Generic drug:  sod picosulf-mag ox-citric ac USE AS DIRECTED  
  
 simvastatin 40 mg tablet Commonly known as:  ZOCOR Take 40 mg by mouth nightly. Indications: HYPERLIPIDEMIA  
  
 VIMOVO 500-20 mg Tbid Generic drug:  Naproxen-Esomeprazole Mag Take  by mouth. * Notice: This list has 4 medication(s) that are the same as other medications prescribed for you. Read the directions carefully, and ask your doctor or other care provider to review them with you. Prescriptions Sent to Pharmacy Refills amitriptyline (ELAVIL) 10 mg tablet 1 Starting on: 2/18/2018 Sig: Take 2 Tabs by mouth nightly. Indications: NEUROPATHIC PAIN Class: Normal  
 Pharmacy: [facility] [address], [facility] [place]  Ph #: [phone] Route: Oral  
 gabapentin (NEURONTIN) 300 mg capsule 1 Sig: Take 1 Cap by mouth three (3) times daily. Class: Normal  
 Pharmacy: [facility] [address], [address] [place]  Ph #: [phone] Route: Oral  
  
Follow-up Instructions Return in about 6 months (around 8/12/2018). Patient Instructions Decreasing Gabapentin: 
Stop Gabapentin 300 mg at night x 2 weeks. If no increased symptoms, may stop middle of the day dose. Introducing [facility] SERVICES! [facility] introduces [employer] patient portal. Now you can access parts of your medical record, email your doctor's office, and request medication refills online. 1. In your internet browser, go to [email or web address] 2. Click on the First Time User? Click Here link in the Sign In box. You will see the New Member Sign Up page. 3. Enter your Loopt Access Code exactly as it appears below. You will not need to use this code after youve completed the sign-up process. If you do not sign up before the expiration date, you must request a new code. · [employer]opt Access Code: [email or web address] Expires: 5/13/2018  4:00 PM 
 
4. Enter the last four digits of your Social Security Number (xxxx) and Date of Birth (mm/dd/yyyy) as indicated and click Submit. You will be taken to the next sign-up page. 5. Create a [employer] ID. This will be your Loopt login ID and cannot be changed, so think of one that is secure and easy to remember. 6. Create a [employer] password. You can change your password at any time. 7. Enter your Password Reset Question and Answer. This can be used at a later time if you forget your password. 8. Enter your e-mail address. You will receive e-mail notification when new information is available in [address] 9. Click Sign Up. You can now view and download portions of your medical record. 10. Click the Download Summary menu link to download a portable copy of your medical information. If you have questions, please visit the Frequently Asked Questions section of the [employer] website. Remember, [employer] is NOT to be used for urgent needs. For medical emergencies, dial 911. Now available from your iPhone and Android! Please provide this summary of care documentation to your next provider. Your primary care clinician is listed as [employer]. If you have any questions after today's visit, please call [phone].

## 2018-02-12 NOTE — PATIENT INSTRUCTIONS
Decreasing Gabapentin:  Stop Gabapentin 300 mg at night x 2 weeks. If no increased symptoms, may stop middle of the day dose.

## 2018-02-12 NOTE — PROGRESS NOTES
[facility] [address]  [address], [address]  [place] [address]  Phone: [phone]  Fax: [phone]        Manasa Thompson  : 1955  PCP: Rigoberto Beckford MD    PROGRESS NOTE      ASSESSMENT AND PLAN    Diagnoses and all orders for this visit:    1. Hx of laminectomy, LL4/5 Dr. Erik Townsend, some LLE residuals  -     amitriptyline (ELAVIL) 10 mg tablet; Take 2 Tabs by mouth nightly. Indications: NEUROPATHIC PAIN  -     gabapentin (NEURONTIN) 300 mg capsule; Take 1 Cap by mouth three (3) times daily. 2. Stenosis of lateral recess of lumbosacral spine    1. Advised to continue HEP. 2. Discussed trial of decreasing Gabapentin when pt is ready: Stop Gabapentin 300 mg QHS x 2 weeks. If no increased symptoms, may stop middle of the day dose. Symptoms may be manageable by monotherapy. 3. Continue Elavil. 4. Lifting limit of 20 lbs. Follow-up Disposition:  Return in about 6 months (around 2018). HISTORY OF PRESENT ILLNESS  Cindy Cordova is a 60 y.o. female. Pt presents to the office for a f/u visit for back pain. Last visit she was weaned off of Gabapentin and was given a trial of Elavil. She did not stop taking Gabapentin. States that she was not aware that she was supposed to stop. She has started her trial of Elavil 20 mg, reports relief. She denies any negative side effects. Pt continues to have back pain. Pt reports BLE radiating pain has gotten better. Pt had a flare of sciatic pain the other day. She is now able to sleep through the night with current medication regimen. She has been able to manage her pain much better lately. Has been doing her ankle pumps which has increased strength in her LE. Pt denies saddle paresthesias. Pt states she has been using Gabapentin 300 mg TID and Elavil 20 mg QHS with relief. Denies persistent fevers, chills, weight changes, neurogenic bowel or bladder symptoms.  Pt denies recent ED visits or hospitalizations.  reviewed. Pt had L L4-5 laminectomy by Dr. Tahira Carrera in 2008. Had to have blood patch post spinal injection in the past.     Pain Assessment  2/12/2018   Location of Pain Back   Severity of Pain 2   Quality of Pain Aching   Quality of Pain Comment -   Frequency of Pain Constant   Aggravating Factors (No Data)   Aggravating Factors Comment cold climate   Relieving Factors (No Data)   Relieving Factors Comment Patient states MDP helps, Gabapentin, and Elavil       PAST MEDICAL HISTORY   Past Medical History:   Diagnosis Date    Arthritis     Chronic pain     Hx of laminectomy, LL4/5 Dr. Tahira Carrera, some LLE residuals 11/3/2017    Hypertension        Past Surgical History:   Procedure Laterality Date    HX GYN      HX LUMBAR LAMINECTOMY Left     Laminectomy L4/5 Dr. Tahira Carrera about 2008   . MEDICATIONS    Current Outpatient Prescriptions   Medication Sig Dispense Refill    lisinopril (PRINIVIL, ZESTRIL) 5 mg tablet TK 1 T PO QD  0    metoprolol succinate (TOPROL-XL) 25 mg XL tablet TK 1 T PO QD  2    amitriptyline (ELAVIL) 10 mg tablet Take 2 Tabs by mouth nightly. 60 Tab 0    cyclobenzaprine (FLEXERIL) 10 mg tablet TK 1/2 TO 1 T PO HS PRN  0    HYDROcodone-acetaminophen (NORCO) 5-325 mg per tablet TK 1 T PO BID PRN  0    methylPREDNISolone (MEDROL DOSEPACK) 4 mg tablet TK UTD WITH FOOD FOR 6 DAYS  0    predniSONE (DELTASONE) 10 mg tablet   0    PREPOPIK 10 mg-3.5 gram-12 gram pwpk USE AS DIRECTED  0    methylPREDNISolone (MEDROL DOSEPACK) 4 mg tablet Take as directed. 1 Dose Pack 0    gabapentin (NEURONTIN) 300 mg capsule Take 1 Cap by mouth three (3) times daily. 270 Cap 0    carisoprodol (SOMA) 350 mg tablet       hydroCHLOROthiazide (HYDRODIURIL) 25 mg tablet       meloxicam (MOBIC) 15 mg tablet       Naproxen-Esomeprazole Mag (VIMOVO) 500-20 mg TbID Take  by mouth.  simvastatin (ZOCOR) 40 mg tablet Take 40 mg by mouth nightly.  Indications: HYPERLIPIDEMIA      metoprolol tartrate (LOPRESSOR) 25 mg tablet Take 25 mg by mouth daily. 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 Indications: HYPERTENSION          ALLERGIES  Allergies   Allergen Reactions    Indocin [Indomethacin] Rash          SOCIAL HISTORY    Social History     Social History    Marital status:      Spouse name: N/A    Number of children: N/A    Years of education: N/A     Occupational History    Not on file. Social History Main Topics    Smoking status: Former Smoker    Smokeless tobacco: Former User    Alcohol use Yes      Comment: Occasionally    Drug use: No    Sexual activity: Not on file     Other Topics Concern    Not on file     Social History Narrative       FAMILY HISTORY  Family History   Problem Relation Age of Onset    Hypertension Mother     Hypertension Father     Stroke Father     Hypertension Sister     Cancer Sister     Hypertension Brother     Alcohol abuse Brother     Arthritis-osteo Brother     Heart Disease Brother        REVIEW OF SYSTEMS  Review of Systems   Constitutional: Negative for chills, fever and weight loss. Respiratory: Negative for shortness of breath. Cardiovascular: Negative for chest pain. Gastrointestinal: Negative for constipation. Negative for fecal incontinence   Genitourinary: Negative for dysuria. Negative for urinary incontinence   Musculoskeletal:        Per HPI   Skin: Negative for rash. Neurological: Negative for dizziness, tingling, tremors, focal weakness and headaches. Endo/Heme/Allergies: Does not bruise/bleed easily. Psychiatric/Behavioral: The patient does not have insomnia.         PHYSICAL EXAMINATION  Visit Vitals    /65    Pulse (!) 113    Temp 98.5 °F (36.9 °C) (Oral)    Resp 18    Ht 6' (1.829 m)    Wt 299 lb 3.2 oz (135.7 kg)    SpO2 97%    BMI 40.58 kg/m2         Accompanied by self.      Constitutional:  Well developed, well nourished, in no acute distress. Psychiatric: Affect and mood are appropriate. Integumentary: No rashes or abrasions noted on exposed areas. Cardiovascular/Peripheral Vascular: Intact l pulses. No peripheral edema is noted. Lymphatic:  No evidence of lymphedema. No cervical lymphadenopathy. SPINE/MUSCULOSKELETAL EXAM      Lumbar spine:  No rash, ecchymosis, or gross obliquity. No fasciculations. No focal atrophy is noted. Tenderness to palpation of L4-5 midline. Tenderness to palpation at the L sciatic notch. SI joints non-tender. Trochanters non tender. Sensation grossly intact to light touch. MOTOR:       Hip Flex  Quads Hamstrings Ankle DF EHL Ankle PF   Right +4/5 +4/5 +4/5 +4/5 +4/5 +4/5   Left +4/5 +4/5 +4/5 +4/5 +4/5 +4/5     Straight leg raise is negative. Mild L eversion weakness. Ambulation without assistive device. FWB. Written by Hanane Livingston, as dictated by Trent Cordon MD.    I, Dr. Trent Cordon MD, confirm that all documentation is accurate. Ms. Bree Cassidy may have a reminder for a \"due or due soon\" health maintenance. I have asked that she contact her primary care provider for follow-up on this health maintenance.

## 2018-06-25 ENCOUNTER — WALK IN (OUTPATIENT)
Dept: URGENT CARE | Age: 63
End: 2018-06-25

## 2018-06-25 VITALS
WEIGHT: 279.76 LBS | OXYGEN SATURATION: 98 % | TEMPERATURE: 99.4 F | RESPIRATION RATE: 16 BRPM | HEART RATE: 98 BPM | SYSTOLIC BLOOD PRESSURE: 128 MMHG | DIASTOLIC BLOOD PRESSURE: 78 MMHG | HEIGHT: 72 IN | BODY MASS INDEX: 37.89 KG/M2

## 2018-06-25 DIAGNOSIS — N39.0 URINARY TRACT INFECTION WITH HEMATURIA, SITE UNSPECIFIED: ICD-10-CM

## 2018-06-25 DIAGNOSIS — R31.9 URINARY TRACT INFECTION WITH HEMATURIA, SITE UNSPECIFIED: ICD-10-CM

## 2018-06-25 DIAGNOSIS — R35.0 FREQUENCY OF URINATION: Primary | ICD-10-CM

## 2018-06-25 LAB
APPEARANCE UR: CLEAR
BILIRUB UR QL: ABNORMAL
COLOR UR: YELLOW
GLUCOSE UR-MCNC: ABNORMAL MG/DL
HGB UR QL: ABNORMAL
KETONES UR-MCNC: ABNORMAL MG/DL
LEUKOCYTE ESTERASE UR QL STRIP: ABNORMAL
NITRITE UR QL: ABNORMAL
PH UR: 6.5 [PH]
PROT UR QL: ABNORMAL
SP GR UR: 1.01
SPECIMEN SOURCE: ABNORMAL
UROBILINOGEN UR QL: 0.2

## 2018-06-25 PROCEDURE — 87086 URINE CULTURE/COLONY COUNT: CPT | Performed by: INTERNAL MEDICINE

## 2018-06-25 PROCEDURE — 81002 URINALYSIS NONAUTO W/O SCOPE: CPT | Performed by: NURSE PRACTITIONER

## 2018-06-25 PROCEDURE — 87088 URINE BACTERIA CULTURE: CPT | Performed by: INTERNAL MEDICINE

## 2018-06-25 PROCEDURE — 99204 OFFICE O/P NEW MOD 45 MIN: CPT | Performed by: NURSE PRACTITIONER

## 2018-06-25 RX ORDER — GABAPENTIN 100 MG/1
100 CAPSULE ORAL DAILY
COMMUNITY
End: 2018-12-10 | Stop reason: DRUGHIGH

## 2018-06-25 RX ORDER — CIPROFLOXACIN 250 MG/1
250 TABLET, FILM COATED ORAL 2 TIMES DAILY
Qty: 10 TABLET | Refills: 0 | Status: SHIPPED | OUTPATIENT
Start: 2018-06-25 | End: 2018-06-30

## 2018-06-25 RX ORDER — SIMVASTATIN 40 MG
40 TABLET ORAL NIGHTLY
COMMUNITY
End: 2018-12-10 | Stop reason: SDUPTHER

## 2018-06-25 RX ORDER — CARISOPRODOL 350 MG/1
350 TABLET ORAL NIGHTLY PRN
COMMUNITY
End: 2018-12-10 | Stop reason: ALTCHOICE

## 2018-06-25 RX ORDER — LISINOPRIL 5 MG/1
5 TABLET ORAL DAILY
COMMUNITY
End: 2018-12-10 | Stop reason: SDUPTHER

## 2018-06-25 ASSESSMENT — ENCOUNTER SYMPTOMS
GASTROINTESTINAL NEGATIVE: 1
RESPIRATORY NEGATIVE: 1
CONSTITUTIONAL NEGATIVE: 1
HEADACHES: 0

## 2018-06-26 LAB
BACTERIA UR CULT: NORMAL
REPORT STATUS (RPT): NORMAL
SPECIMEN SOURCE: NORMAL

## 2018-08-01 ENCOUNTER — OFFICE VISIT (OUTPATIENT)
Dept: ORTHOPEDIC SURGERY | Age: 63
End: 2018-08-01

## 2018-08-01 VITALS
HEART RATE: 99 BPM | SYSTOLIC BLOOD PRESSURE: 127 MMHG | DIASTOLIC BLOOD PRESSURE: 79 MMHG | BODY MASS INDEX: 37.38 KG/M2 | HEIGHT: 72 IN | WEIGHT: 276 LBS | OXYGEN SATURATION: 98 %

## 2018-08-01 DIAGNOSIS — M54.16 LUMBAR RADICULAR PAIN: ICD-10-CM

## 2018-08-01 DIAGNOSIS — M48.07 STENOSIS OF LATERAL RECESS OF LUMBOSACRAL SPINE: ICD-10-CM

## 2018-08-01 DIAGNOSIS — M51.26 PROTRUDED LUMBAR DISC: Primary | ICD-10-CM

## 2018-08-01 DIAGNOSIS — M47.816 LUMBAR FACET ARTHROPATHY: ICD-10-CM

## 2018-08-01 RX ORDER — TOPIRAMATE 25 MG/1
TABLET ORAL
Qty: 90 TAB | Refills: 2 | Status: SHIPPED | OUTPATIENT
Start: 2018-08-01 | End: 2019-04-11 | Stop reason: SINTOL

## 2018-08-01 NOTE — PROGRESS NOTES
Chief complaint/History of Present Illness:  Chief Complaint   Patient presents with    Back Pain     increased back pain     HPI  Giulia Wooten is a  58 y.o.  female      HISTORY OF PRESENT ILLNESS:  The patient comes in today to discuss her medications. She has chronic low back pain radiating into the left lower extremity. It can go down to her foot. She has been on gabapentin 300 mg t.i.d., but was experiencing side effects, mostly sexual in nature. She was anorgasmic and she states it just kind of made everything numb. She is also on Elavil 20 mg at bedtime. It is also giving her some of the same side effects. She would like to try a different medication. She is status post a left L4-L5 laminectomy in 2008 by Dr. Marcene Primrose, which did help with the left foot drop and leg pain. She has been off the gabapentin about 10 days now. She reports a new medical issue, as she may need a left total knee replacement. She will be seeing Dr. Patrizia Gomez for that. She is retired. She is a nonsmoker. PHYSICAL EXAMINATION:  Ms. Toyin Yang is a 40-year-old female. She is alert and oriented. Normal mood and affect. She has a full weightbearing, antalgic gait. No assistive device. Has 4/5 strength in bilateral lower extremities. Negative straight leg raise. She does not have any pain with hyperextension of her spine. ASSESSMENT/PLAN:  This is a patient with lumbar facet disease, lumbar spinal stenosis, and lumbar spondylosis. She will wean off of the Elavil. I gave her a weaning regimen. We are going to taper her up on Topamax to 75 mg at night. Hopefully, this will not give her side effects and help with her back and leg pain. We did discuss blocks. We decided to hold off on that at this time. She is going to be going out of town for a couple of months. She will call us if she has any issues with the medication.   We will see her back in 10/2018 with Dr. Chuck Diaz at her scheduled appointment. Review of systems:    Past Medical History:   Diagnosis Date    Arthritis     Chronic pain     Hx of laminectomy, LL4/5 Dr. Fiona Acosta, some LLE residuals 11/3/2017    Hypertension      Past Surgical History:   Procedure Laterality Date    HX GYN      HX LUMBAR LAMINECTOMY Left     Laminectomy L4/5 Dr. Fiona Acosta about 2008     Social History     Social History    Marital status:      Spouse name: N/A    Number of children: N/A    Years of education: N/A     Occupational History    Not on file. Social History Main Topics    Smoking status: Former Smoker    Smokeless tobacco: Former User    Alcohol use Yes      Comment: Occasionally    Drug use: No    Sexual activity: Not on file     Other Topics Concern    Not on file     Social History Narrative     Family History   Problem Relation Age of Onset    Hypertension Mother     Hypertension Father     Stroke Father     Hypertension Sister     Cancer Sister     Hypertension Brother     Alcohol abuse Brother     Arthritis-osteo Brother     Heart Disease Brother        Physical Exam:  Visit Vitals    /79    Pulse 99    Ht 6' (1.829 m)    Wt 276 lb (125.2 kg)    SpO2 98%    BMI 37.43 kg/m2     Pain Scale: 4/10            has been . reviewed and is appropriate        Diagnoses and all orders for this visit:    1. Protruded lumbar disc, LL5/S1    2. Lumbar facet arthropathy, L3/4    3. Lumbar radicular pain    4. Stenosis of lateral recess of lumbosacral spine    Other orders  -     topiramate (TOPAMAX) 25 mg tablet; 1 tab nightly  x 5 days, then 2 tabs nightly x 5 day and then 3 tabs nightly            Follow-up Disposition:  Return for 10/24/19  with Dr Wyatt Aragon as scheduled.         We have informed Mabel Chapin to notify us for immediate appointment if she has any worsening neurogical symptoms or if an emergency situation presents, then call 911

## 2018-11-19 ENCOUNTER — APPOINTMENT (OUTPATIENT)
Dept: LAB | Age: 63
End: 2018-11-19

## 2018-11-19 ENCOUNTER — HOSPITAL ENCOUNTER (EMERGENCY)
Age: 63
Discharge: HOME | End: 2018-11-19

## 2018-11-19 ENCOUNTER — APPOINTMENT (OUTPATIENT)
Dept: GENERAL RADIOLOGY | Age: 63
End: 2018-11-19

## 2018-11-19 ENCOUNTER — TELEPHONE (OUTPATIENT)
Dept: ORTHOPEDICS | Age: 63
End: 2018-11-19

## 2018-11-19 ENCOUNTER — TELEPHONE (OUTPATIENT)
Dept: URGENT CARE | Age: 63
End: 2018-11-19

## 2018-11-19 ENCOUNTER — WALK IN (OUTPATIENT)
Dept: URGENT CARE | Age: 63
End: 2018-11-19

## 2018-11-19 ENCOUNTER — HOSPITAL ENCOUNTER (OUTPATIENT)
Dept: ULTRASOUND IMAGING | Age: 63
Discharge: HOME | End: 2018-11-19
Attending: EMERGENCY MEDICINE

## 2018-11-19 VITALS
RESPIRATION RATE: 16 BRPM | HEART RATE: 84 BPM | TEMPERATURE: 97.5 F | SYSTOLIC BLOOD PRESSURE: 96 MMHG | OXYGEN SATURATION: 98 % | DIASTOLIC BLOOD PRESSURE: 52 MMHG | WEIGHT: 248 LBS | BODY MASS INDEX: 33.59 KG/M2 | HEIGHT: 72 IN

## 2018-11-19 VITALS
DIASTOLIC BLOOD PRESSURE: 74 MMHG | SYSTOLIC BLOOD PRESSURE: 132 MMHG | TEMPERATURE: 98.3 F | RESPIRATION RATE: 18 BRPM | HEART RATE: 91 BPM | OXYGEN SATURATION: 98 %

## 2018-11-19 DIAGNOSIS — M25.562 ACUTE PAIN OF LEFT KNEE: ICD-10-CM

## 2018-11-19 DIAGNOSIS — M25.562 ACUTE PAIN OF LEFT KNEE: Primary | ICD-10-CM

## 2018-11-19 DIAGNOSIS — M79.605 LEFT LEG PAIN: Primary | ICD-10-CM

## 2018-11-19 DIAGNOSIS — M79.605 LEFT LEG PAIN: ICD-10-CM

## 2018-11-19 PROCEDURE — 96375 TX/PRO/DX INJ NEW DRUG ADDON: CPT

## 2018-11-19 PROCEDURE — 96376 TX/PRO/DX INJ SAME DRUG ADON: CPT

## 2018-11-19 PROCEDURE — 93971 EXTREMITY STUDY: CPT | Performed by: RADIOLOGY

## 2018-11-19 PROCEDURE — 73564 X-RAY EXAM KNEE 4 OR MORE: CPT | Performed by: RADIOLOGY

## 2018-11-19 PROCEDURE — 96374 THER/PROPH/DIAG INJ IV PUSH: CPT

## 2018-11-19 PROCEDURE — 10002800 HB RX 250 W HCPCS: Performed by: PHYSICIAN ASSISTANT

## 2018-11-19 PROCEDURE — 10004157 XR KNEE 4+ VW LEFT

## 2018-11-19 PROCEDURE — 73564 X-RAY EXAM KNEE 4 OR MORE: CPT

## 2018-11-19 PROCEDURE — 99203 OFFICE O/P NEW LOW 30 MIN: CPT | Performed by: EMERGENCY MEDICINE

## 2018-11-19 PROCEDURE — 93971 EXTREMITY STUDY: CPT

## 2018-11-19 PROCEDURE — 99283 EMERGENCY DEPT VISIT LOW MDM: CPT

## 2018-11-19 PROCEDURE — 99283 EMERGENCY DEPT VISIT LOW MDM: CPT | Performed by: PHYSICIAN ASSISTANT

## 2018-11-19 RX ORDER — HYDROCODONE BITARTRATE AND ACETAMINOPHEN 5; 325 MG/1; MG/1
TABLET ORAL
Qty: 20 TABLET | Refills: 0 | Status: SHIPPED | OUTPATIENT
Start: 2018-11-19 | End: 2019-06-03 | Stop reason: ALTCHOICE

## 2018-11-19 RX ORDER — HYDROCODONE BITARTRATE AND ACETAMINOPHEN 5; 325 MG/1; MG/1
1 TABLET ORAL ONCE
Status: COMPLETED | OUTPATIENT
Start: 2018-11-19 | End: 2018-11-19

## 2018-11-19 RX ORDER — CYCLOBENZAPRINE HCL 10 MG
10 TABLET ORAL 3 TIMES DAILY PRN
Qty: 15 TABLET | Refills: 0 | Status: SHIPPED | OUTPATIENT
Start: 2018-11-19 | End: 2018-12-10 | Stop reason: ALTCHOICE

## 2018-11-19 RX ORDER — ONDANSETRON 2 MG/ML
4 INJECTION INTRAMUSCULAR; INTRAVENOUS
Status: COMPLETED | OUTPATIENT
Start: 2018-11-19 | End: 2018-11-19

## 2018-11-19 RX ADMIN — HYDROCODONE BITARTRATE AND ACETAMINOPHEN 1 TABLET: 5; 325 TABLET ORAL at 13:50

## 2018-11-19 RX ADMIN — HYDROMORPHONE HYDROCHLORIDE 0.5 MG: 1 INJECTION, SOLUTION INTRAMUSCULAR; INTRAVENOUS; SUBCUTANEOUS at 18:06

## 2018-11-19 RX ADMIN — ONDANSETRON HYDROCHLORIDE 4 MG: 2 INJECTION, SOLUTION INTRAMUSCULAR; INTRAVENOUS at 17:09

## 2018-11-19 RX ADMIN — HYDROMORPHONE HYDROCHLORIDE 1 MG: 1 INJECTION, SOLUTION INTRAMUSCULAR; INTRAVENOUS; SUBCUTANEOUS at 17:11

## 2018-11-19 ASSESSMENT — ENCOUNTER SYMPTOMS
WEAKNESS: 0
WOUND: 0
CHILLS: 0
VOMITING: 0
FEVER: 0
NUMBNESS: 0
NAUSEA: 0
BRUISES/BLEEDS EASILY: 0

## 2018-11-19 ASSESSMENT — PAIN SCALES - GENERAL
PAINLEVEL_OUTOF10: 8
PAINLEVEL_OUTOF10: 2

## 2018-12-10 ENCOUNTER — OFFICE VISIT (OUTPATIENT)
Dept: FAMILY MEDICINE | Age: 63
End: 2018-12-10

## 2018-12-10 VITALS
DIASTOLIC BLOOD PRESSURE: 70 MMHG | BODY MASS INDEX: 33.44 KG/M2 | TEMPERATURE: 98.4 F | RESPIRATION RATE: 13 BRPM | HEART RATE: 78 BPM | WEIGHT: 246.91 LBS | SYSTOLIC BLOOD PRESSURE: 118 MMHG | HEIGHT: 72 IN

## 2018-12-10 DIAGNOSIS — M12.9 ARTHRITIS INVOLVING MULTIPLE SITES: ICD-10-CM

## 2018-12-10 DIAGNOSIS — N39.0 RECURRENT UTI: Primary | ICD-10-CM

## 2018-12-10 DIAGNOSIS — I10 BENIGN ESSENTIAL HTN: ICD-10-CM

## 2018-12-10 DIAGNOSIS — E78.2 MIXED HYPERLIPIDEMIA: ICD-10-CM

## 2018-12-10 PROBLEM — M48.07 STENOSIS OF LATERAL RECESS OF LUMBOSACRAL SPINE: Status: ACTIVE | Noted: 2018-02-12

## 2018-12-10 PROBLEM — M51.26 PROTRUDED LUMBAR DISC: Status: ACTIVE | Noted: 2017-11-03

## 2018-12-10 PROBLEM — E78.5 HLD (HYPERLIPIDEMIA): Status: ACTIVE | Noted: 2018-09-17

## 2018-12-10 LAB
APPEARANCE UR: CLEAR
BILIRUB UR QL: NORMAL
COLOR UR: YELLOW
GLUCOSE UR-MCNC: NORMAL MG/DL
HGB UR QL: NORMAL
KETONES UR-MCNC: NORMAL MG/DL
LEUKOCYTE ESTERASE UR QL STRIP: NORMAL
NITRITE UR QL: NORMAL
PH UR: 6 [PH]
PROT UR QL: NORMAL
SP GR UR: 1.02
SPECIMEN SOURCE: NORMAL
UROBILINOGEN UR QL: 0.2

## 2018-12-10 PROCEDURE — 99203 OFFICE O/P NEW LOW 30 MIN: CPT | Performed by: FAMILY MEDICINE

## 2018-12-10 PROCEDURE — 81002 URINALYSIS NONAUTO W/O SCOPE: CPT | Performed by: FAMILY MEDICINE

## 2018-12-10 RX ORDER — CETIRIZINE HYDROCHLORIDE 10 MG/1
10 TABLET ORAL DAILY
COMMUNITY

## 2018-12-10 RX ORDER — PHENOL 1.4 %
AEROSOL, SPRAY (ML) MUCOUS MEMBRANE
COMMUNITY
End: 2021-09-01 | Stop reason: ALTCHOICE

## 2018-12-10 RX ORDER — AMOXICILLIN 250 MG
CAPSULE ORAL
COMMUNITY
Start: 2018-09-17 | End: 2018-12-10 | Stop reason: ALTCHOICE

## 2018-12-10 RX ORDER — CELECOXIB 100 MG/1
100 CAPSULE ORAL DAILY
Qty: 90 CAPSULE | Refills: 0 | Status: SHIPPED | OUTPATIENT
Start: 2018-12-10 | End: 2019-06-03 | Stop reason: SDUPTHER

## 2018-12-10 RX ORDER — NAPROXEN SODIUM 220 MG
220 TABLET ORAL 2 TIMES DAILY WITH MEALS
COMMUNITY
End: 2018-12-10 | Stop reason: ALTCHOICE

## 2018-12-10 RX ORDER — HYDROCHLOROTHIAZIDE 25 MG/1
25 TABLET ORAL DAILY
COMMUNITY
End: 2019-06-03 | Stop reason: SDUPTHER

## 2018-12-10 RX ORDER — LISINOPRIL 5 MG/1
5 TABLET ORAL DAILY
Qty: 90 TABLET | Refills: 3 | Status: SHIPPED | OUTPATIENT
Start: 2018-12-10 | End: 2019-12-29 | Stop reason: SDUPTHER

## 2018-12-10 RX ORDER — METOPROLOL SUCCINATE 25 MG/1
25 TABLET, EXTENDED RELEASE ORAL DAILY
COMMUNITY
End: 2018-12-10 | Stop reason: SDUPTHER

## 2018-12-10 RX ORDER — TOPIRAMATE 25 MG/1
75 TABLET ORAL DAILY
COMMUNITY
Start: 2018-08-01 | End: 2018-12-10 | Stop reason: ALTCHOICE

## 2018-12-10 RX ORDER — GABAPENTIN 300 MG/1
300 CAPSULE ORAL 3 TIMES DAILY
COMMUNITY
Start: 2018-02-12 | End: 2020-07-02 | Stop reason: CLARIF

## 2018-12-10 RX ORDER — METOPROLOL SUCCINATE 25 MG/1
25 TABLET, EXTENDED RELEASE ORAL DAILY
Qty: 90 TABLET | Refills: 3 | Status: SHIPPED | OUTPATIENT
Start: 2018-12-10 | End: 2019-12-22 | Stop reason: SDUPTHER

## 2018-12-10 RX ORDER — SIMVASTATIN 40 MG
40 TABLET ORAL NIGHTLY
Qty: 90 TABLET | Refills: 1 | Status: SHIPPED | OUTPATIENT
Start: 2018-12-10 | End: 2019-07-12 | Stop reason: SDUPTHER

## 2018-12-10 RX ORDER — CEPHALEXIN 250 MG/1
CAPSULE ORAL
Qty: 30 CAPSULE | Refills: 0 | Status: SHIPPED | OUTPATIENT
Start: 2018-12-10 | End: 2019-01-31 | Stop reason: SDUPTHER

## 2018-12-10 RX ORDER — CIPROFLOXACIN 500 MG/1
500 TABLET, FILM COATED ORAL 2 TIMES DAILY
Qty: 10 TABLET | Refills: 0 | Status: SHIPPED | OUTPATIENT
Start: 2018-12-10 | End: 2018-12-15

## 2018-12-10 RX ORDER — AMITRIPTYLINE HYDROCHLORIDE 10 MG/1
20 TABLET, FILM COATED ORAL NIGHTLY
COMMUNITY
Start: 2018-02-18 | End: 2018-12-10 | Stop reason: ALTCHOICE

## 2018-12-12 LAB
BACTERIA UR CULT: ABNORMAL
ORGANISM: ABNORMAL
REPORT STATUS (RPT): ABNORMAL
SPECIMEN SOURCE: ABNORMAL

## 2018-12-13 ENCOUNTER — TELEPHONE (OUTPATIENT)
Dept: FAMILY MEDICINE | Age: 63
End: 2018-12-13

## 2018-12-16 ASSESSMENT — ENCOUNTER SYMPTOMS
HEADACHES: 0
BACK PAIN: 1
NUMBNESS: 0
DIARRHEA: 0
DIZZINESS: 0
SHORTNESS OF BREATH: 0
VOMITING: 0
CHILLS: 0
NAUSEA: 0
FEVER: 0
CONSTIPATION: 0
ABDOMINAL PAIN: 0

## 2019-01-31 DIAGNOSIS — N39.0 RECURRENT UTI: ICD-10-CM

## 2019-02-01 RX ORDER — CEPHALEXIN 250 MG/1
CAPSULE ORAL
Qty: 30 CAPSULE | Refills: 1 | Status: SHIPPED | OUTPATIENT
Start: 2019-02-01 | End: 2019-06-03 | Stop reason: SDUPTHER

## 2019-04-11 ENCOUNTER — OFFICE VISIT (OUTPATIENT)
Dept: ORTHOPEDIC SURGERY | Age: 64
End: 2019-04-11

## 2019-04-11 VITALS
HEART RATE: 91 BPM | OXYGEN SATURATION: 97 % | TEMPERATURE: 98 F | HEIGHT: 72 IN | DIASTOLIC BLOOD PRESSURE: 82 MMHG | SYSTOLIC BLOOD PRESSURE: 129 MMHG | WEIGHT: 264.2 LBS | RESPIRATION RATE: 18 BRPM | BODY MASS INDEX: 35.78 KG/M2

## 2019-04-11 DIAGNOSIS — Z98.890 HX OF LAMINECTOMY: ICD-10-CM

## 2019-04-11 DIAGNOSIS — M54.16 LUMBAR RADICULOPATHY: Primary | ICD-10-CM

## 2019-04-11 DIAGNOSIS — M51.26 PROTRUDED LUMBAR DISC: ICD-10-CM

## 2019-04-11 RX ORDER — GABAPENTIN 300 MG/1
300 CAPSULE ORAL 3 TIMES DAILY
Qty: 270 CAP | Refills: 3 | Status: SHIPPED | OUTPATIENT
Start: 2019-04-11 | End: 2019-07-26 | Stop reason: SDUPTHER

## 2019-04-11 RX ORDER — CETIRIZINE HCL 10 MG
10 TABLET ORAL DAILY
COMMUNITY

## 2019-04-11 RX ORDER — OMEPRAZOLE 20 MG/1
20 TABLET, DELAYED RELEASE ORAL DAILY
COMMUNITY
End: 2019-10-10

## 2019-04-11 NOTE — PROGRESS NOTES
[facility] [address]. 
[address], [address] [place], [address] [place] [address] Street Phone: [phone] Fax: [phone] Jannette Session : 1955 PCP: Tyrese Oconnor MD 
 
PROGRESS NOTE ASSESSMENT AND PLAN Diagnoses and all orders for this visit: 1. Lumbar radiculopathy -     MRI LUMB SPINE W WO CONT; Future 2. Protruded lumbar disc, LL5/S1 
-     MRI LUMB SPINE W WO CONT; Future 3. Hx of laminectomy, LL4/5 Dr. Etelvina Phipps, some LLE residuals 
-     gabapentin (NEURONTIN) 300 mg capsule; Take 1 Cap by mouth three (3) times daily. 
-     MRI LUMB SPINE W WO CONT; Future 1. 64yo with ongoing LLE radiculopathy, worse over last year, now requiring routine Gabapentin. Advised to continue HEP. Morning stretches. 2. Continue Gabapentin 4. MRI lumbar spine - low back pain, radiating into LLE. Hx of surgery. Difficulty with gait. Slow rehab post TKA 7 months ago. 5. Given information on neuropathic pain F/U after MRI HISTORY OF PRESENT ILLNESS Shy Zheng is a 58 y.o. female. Pt last evaluated 2018 by BRONSON De Los Santos. Pt notes she tried to taper Gabapentin recently, but experienced nerve pain again. She affirms coming off Elavil. Pt notes she tried Topamax with weight loss, but ultimately no enjoyment in food. She states she has gained a bit of weight since eating normally after DCing Topamax, but denies weight gain due to Gabapentin. She reports some sexual side effects with Gabapentin. She notes prolonged walking or standing will aggravate her back. Whenever she tries to wean Gabapentin, her leg pain flares. She c/o back pain in the morning also. She reports imbalance. Pt expresses concern about persistence of LLE pain so that she needs to maintain Gabapentin. She notes her LLE improved after surgery then worsened in 2017. Pt wonders if residual LLE pain could complicate her L knee rehab. She reports pain with L knee replacement and a longer healing process than her R 6 years ago. She reports taking Raynette Else to [place] then back, then [place] and back the month after her L knee surgery. She emphasizes this was a poor choice and aggravated her knee. Location of pain: low back Does pain radiate into extremities: LLE - controlled with Gabapentin. L knee pain and numbness from surgery Does patient have weakness: no  
Pt denies saddle paresthesias. Medications pt is on: Gabapentin 300mg TID. Denies persistent fevers, chills, weight changes, neurogenic bowel or bladder symptoms. Treatments patient has tried: 
Physical therapy:Yes Doing HEP: Yes stretches low back, exercises L knee Non-opioid medications: Yes Spinal injections: Yes with complication - blood patch required. Spinal surgery- Yes. L L4-5 lami 2008 Dr. Munira Hanson Last L MRI 2017: post-op changes, RL5-S1 protrusion. Mod deg change of SI joints.  reviewed. Pt is retired. PMHx of B/L knee replacement. ED 11/10/18 for acute gastroenteritis. Pain Assessment  8/1/2018 Location of Pain Back Location Modifiers Inferior Severity of Pain 4 Quality of Pain Aching; Throbbing Quality of Pain Comment - Duration of Pain A few hours Frequency of Pain Intermittent Aggravating Factors Standing;Walking Aggravating Factors Comment while sleeping Relieving Factors Rest  
Relieving Factors Comment change positions PAST MEDICAL HISTORY Past Medical History:  
Diagnosis Date  Arthritis  Chronic pain  Hx of laminectomy, LL4/5 Dr. Munira Hanson, some LLE residuals 11/3/2017  Hypertension Past Surgical History:  
Procedure Laterality Date  HX GYN    
 HX KNEE REPLACEMENT Left 09/2018  HX KNEE REPLACEMENT Right  HX LUMBAR LAMINECTOMY Left Laminectomy L4/5 Dr. Munira Hanson about 2008 [facility] MEDICATIONS Current Outpatient Medications Medication Sig Dispense Refill  cetirizine (ZYRTEC) 10 mg tablet Take 10 mg by mouth.  omeprazole (PRILOSEC OTC) 20 mg tablet Take 20 mg by mouth daily.  gabapentin (NEURONTIN) 300 mg capsule Take 1 Cap by mouth three (3) times daily. 270 Cap 3  
 ondansetron (ZOFRAN ODT) 4 mg disintegrating tablet Take 1 Tab by mouth every eight (8) hours as needed for Nausea. 20 Tab 0  
 lisinopril (PRINIVIL, ZESTRIL) 5 mg tablet TK 1 T PO QD  0  
 metoprolol succinate (TOPROL-XL) 25 mg XL tablet TK 1 T PO QD  2  
 simvastatin (ZOCOR) 40 mg tablet Take 40 mg by mouth nightly. Indications: HYPERLIPIDEMIA  cyclobenzaprine (FLEXERIL) 10 mg tablet TK 1/2 TO 1 T PO HS PRN  0  
 HYDROcodone-acetaminophen (NORCO) 5-325 mg per tablet TK 1 T PO BID PRN  0  
 predniSONE (DELTASONE) 10 mg tablet   0  
 PREPOPIK 10 mg-3.5 gram-12 gram pwpk USE AS DIRECTED  0  
 carisoprodol (SOMA) 350 mg tablet  hydroCHLOROthiazide (HYDRODIURIL) 25 mg tablet  meloxicam (MOBIC) 15 mg tablet  lisinopril (PRINIVIL, ZESTRIL) 10 mg tablet Take 10 mg by mouth daily. Indications: HYPERTENSION    
  
 
ALLERGIES Allergies Allergen Reactions  Indocin [Indomethacin] Rash  Topamax [Topiramate] Other (comments) Altered taste, couldn't eat SOCIAL HISTORY Social History Socioeconomic History  Marital status:  Spouse name: Not on file  Number of children: Not on file  Years of education: Not on file  Highest education level: Not on file Occupational History  Not on file Social Needs  Financial resource strain: Not on file  Food insecurity:  
  Worry: Not on file Inability: Not on file  Transportation needs:  
  Medical: Not on file Non-medical: Not on file Tobacco Use  Smoking status: Former Smoker  Smokeless tobacco: Former User Substance and Sexual Activity  Alcohol use: Yes Comment: Occasionally  Drug use: No  
 Sexual activity: Not on file Lifestyle  Physical activity:  
  Days per week: Not on file Minutes per session: Not on file  Stress: Not on file Relationships  Social connections:  
  Talks on phone: Not on file Gets together: Not on file Attends Mandaen service: Not on file Active member of club or organization: Not on file Attends meetings of clubs or organizations: Not on file Relationship status: Not on file  Intimate partner violence:  
  Fear of current or ex partner: Not on file Emotionally abused: Not on file Physically abused: Not on file Forced sexual activity: Not on file Other Topics Concern  Not on file Social History Narrative  Not on file Socioeconomic History  Marital status:  Spouse name: Not on file  Number of children: Not on file  Years of education: Not on file  Highest education level: Not on file Occupational History  Not on file Social Needs  Financial resource strain: Not on file  Food insecurity:  
  Worry: Not on file Inability: Not on file  Transportation needs:  
  Medical: Not on file Non-medical: Not on file Tobacco Use  Smoking status: Former Smoker  Smokeless tobacco: Former User Substance and Sexual Activity  Alcohol use: Yes Comment: Occasionally  Drug use: No  
 Sexual activity: Not on file Lifestyle  Physical activity:  
  Days per week: Not on file Minutes per session: Not on file  Stress: Not on file Relationships  Social connections:  
  Talks on phone: Not on file Gets together: Not on file Attends Mandaen service: Not on file Active member of club or organization: Not on file Attends meetings of clubs or organizations: Not on file Relationship status: Not on file  Intimate partner violence:  
  Fear of current or ex partner: Not on file Emotionally abused: Not on file Physically abused: Not on file Forced sexual activity: Not on file Other Topics Concern  Not on file Social History Narrative  Not on file Problem Relation Age of Onset  Hypertension Mother  Hypertension Father  Stroke Father  Hypertension Sister  Cancer Sister  Hypertension Brother  Alcohol abuse Brother  Arthritis-osteo Brother  Heart Disease Brother REVIEW OF SYSTEMS Review of Systems Constitutional: Negative for chills, fever and weight loss. Respiratory: Negative for shortness of breath. Cardiovascular: Negative for chest pain. Gastrointestinal: Negative for constipation. Negative for fecal incontinence Genitourinary: Negative for dysuria. Negative for urinary incontinence Musculoskeletal: Positive for back pain and joint pain. Per HPI Skin: Negative for rash. Neurological: Positive for tingling and focal weakness. Negative for dizziness, tremors and headaches. Endo/Heme/Allergies: Does not bruise/bleed easily. Psychiatric/Behavioral: The patient does not have insomnia. PHYSICAL EXAMINATION Visit Vitals /82 Pulse 91 Temp 98 °F (36.7 °C) Resp 18 Ht 6' (1.829 m) Wt 264 lb 3.2 oz (119.8 kg) SpO2 97% BMI 35.83 kg/m² Accompanied by self. Constitutional:  Well developed, well nourished, in no acute distress. Psychiatric: Affect and mood are appropriate. Integumentary: No rashes or abrasions noted on exposed areas. Cardiovascular/Peripheral Vascular: Intact l pulses. No peripheral edema is noted BLE. Lymphatic:  No evidence of lymphedema. No cervical lymphadenopathy. SPINE/MUSCULOSKELETAL EXAM 
 
 
Lumbar spine: No rash, ecchymosis, or gross obliquity. No fasciculations. No focal atrophy is noted. Pain with L knee ROM. Tenderness to palpation R L3-4, L trochanteric bursa. No tenderness to palpation at the sciatic notch. SI joints non-tender. Allodynia L lateral calf MOTOR:   
 
 Hip Flex  Quads Hamstrings Ankle DF EHL Ankle PF Right +4/5 +4/5 +4/5 +4/5 +4/5 +4/5 Left +4/5 +4/5 +4/5 +4/5 +4/5 +4/5 Eversion weakness on L 4/5. Straight Leg raise negative. Toe rise intact. Heel rise intact. Ambulation without assistive device. FWB. Written by Agustina Clinton, as dictated by Gaynel Favre, MD. 
 
I, Dr. Gaynel Favre, MD, confirm that all documentation is accurate. Ms. Vandana Servin may have a reminder for a \"due or due soon\" health maintenance. I have asked that she contact her primary care provider for follow-up on this health maintenance.

## 2019-04-11 NOTE — PATIENT INSTRUCTIONS
Neuropathic Pain: Care Instructions Your Care Instructions Neuropathic pain is caused by pressure on or damage to your nerves. It's often simply called nerve pain. Some people feel this type of pain all the time. For others, it comes and goes. Diabetes, shingles, or an injury can cause nerve pain. Many people say the pain feels sharp, burning, or stabbing. But some people feel it as a dull ache. In some cases, it makes your skin very sensitive. So touch, pressure, and other sensations that did not hurt before may now cause pain. It's important to know that this kind of pain is real and can affect your quality of life. It's also important to know that treatment can help. Treatment includes pain medicines, exercise, and physical therapy. Medicines can help reduce the number of pain signals that travel over the nerves. This can make the painful areas less sensitive. It can also help you sleep better and improve your mood. But medicines are only one part of successful treatment. Most people do best with more than one kind of treatment. Your doctor may recommend that you try cognitive-behavioral therapy and stress management. Or, if needed, you may decide to try to quit smoking, lower your blood pressure, or better control blood sugar. These kinds of healthy changes can also make a difference. If you feel that your treatment is not working, talk to your doctor. And be sure to tell your doctor if you think you might be depressed or anxious. These are common problems that can also be treated. Follow-up care is a key part of your treatment and safety. Be sure to make and go to all appointments, and call your doctor if you are having problems. It's also a good idea to know your test results and keep a list of the medicines you take. How can you care for yourself at home? · Be safe with medicines. Read and follow all instructions on the label. ? If the doctor gave you a prescription medicine for pain, take it as prescribed. ? If you are not taking a prescription pain medicine, ask your doctor if you can take an over-the-counter medicine. · Save hard tasks for days when you have less pain. Follow a hard task with an easy task. And remember to take breaks. · Relax, and reduce stress. You may want to try deep breathing or meditation. These can help. · Keep moving. Gentle, daily exercise can help reduce pain. Your doctor or physical therapist can tell you what type of exercise is best for you. This may include walking, swimming, and stationary biking. It may also include stretches and range-of-motion exercises. · Try heat, cold packs, and massage. · Get enough sleep. Constant pain can make you more tired. If the pain makes it hard to sleep, talk with your doctor. · Think positively. Your thoughts can affect your pain. Do fun things to distract yourself from the pain. See a movie, read a book, listen to music, or spend time with a friend. · Keep a pain diary. Try to write down how strong your pain is and what it feels like. Also try to notice and write down how your moods, thoughts, sleep, activities, and medicine affect your pain. These notes can help you and your doctor find the best ways to treat your pain. Reducing constipation caused by pain medicine Pain medicines often cause constipation. To reduce constipation: 
· Include fruits, vegetables, beans, and whole grains in your diet each day. These foods are high in fiber. · Drink plenty of fluids, enough so that your urine is light yellow or clear like water. If you have kidney, heart, or liver disease and have to limit fluids, talk with your doctor before you increase the amount of fluids you drink. · Get some exercise every day. Build up slowly to 30 to 60 minutes a day on 5 or more days of the week.  
· Take a fiber supplement, such as Citrucel or Metamucil, every day if needed. Read and follow all instructions on the label. · Schedule time each day for a bowel movement. Having a daily routine may help. Take your time and do not strain when having a bowel movement. · Ask your doctor about a laxative. The goal is to have one easy bowel movement every 1 to 2 days. Do not let constipation go untreated for more than 3 days. When should you call for help? Call your doctor now or seek immediate medical care if: 
  · You feel sad, anxious, or hopeless for more than a few days. This could mean you are depressed. Depression is common in people who have a lot of pain. But it can be treated.  
  · You have trouble with bowel movements, such as: 
? No bowel movement in 3 days. ? Blood in the anal area, in your stool, or on the toilet paper. ? Diarrhea for more than 24 hours.  
 Watch closely for changes in your health, and be sure to contact your doctor if: 
  · Your pain is getting worse.  
  · You can't sleep because of pain.  
  · You are very worried or anxious about your pain.  
  · You have trouble taking your pain medicine.  
  · You have any concerns about your pain medicine or its side effects.  
  · You have vomiting or cramps for more than 2 hours. Where can you learn more? Go to http://jv-merly.info/. Enter L686 in the search box to learn more about \"Neuropathic Pain: Care Instructions. \" Current as of: Shauna 3, 2018 Content Version: 11.9 © 9426-5008 [employer]. Care instructions adapted under license by [employer] (which disclaims liability or warranty for this information). If you have questions about a medical condition or this instruction, always ask your healthcare professional. [place] [address] any warranty or liability for your use of this information.

## 2019-04-12 ENCOUNTER — DOCUMENTATION ONLY (OUTPATIENT)
Dept: ORTHOPEDIC SURGERY | Age: 64
End: 2019-04-12

## 2019-04-12 NOTE — PROGRESS NOTES
Benita, please schedule the MRI at a different location. See pt's my chart message below:    \"Could you please have someone schedule my MRI of my lumbar region with a different place than the MRI Ct place on [facility] in [place]? I had issues with them last time and would like to go to place with a more competent  staff.    Thank you\"

## 2019-04-15 NOTE — PROGRESS NOTES
Order faxed to [address] [place] [address] and they will call patient to set up date for MRI and authorize with insurance if needed.

## 2019-04-23 ENCOUNTER — TELEPHONE (OUTPATIENT)
Dept: ORTHOPEDIC SURGERY | Age: 64
End: 2019-04-23

## 2019-04-23 DIAGNOSIS — M54.16 LUMBAR RADICULOPATHY: Primary | ICD-10-CM

## 2019-04-23 RX ORDER — DIAZEPAM 10 MG/1
TABLET ORAL
Qty: 1 TAB | Refills: 0 | OUTPATIENT
Start: 2019-04-23 | End: 2019-10-10

## 2019-04-23 NOTE — TELEPHONE ENCOUNTER
Verbal order entered per NP [place] as documented in message below: Valium 10mg take 1 tab po 30min prior to MRI. Will need  to and from location. No further actions needed at this time.

## 2019-04-23 NOTE — TELEPHONE ENCOUNTER
Regarding: FW: Non-Urgent Medical Question  Contact: [phone]  Please call in valium 10  Mg 30 mins prior to MRI, #1. Will need  to MRI and back. ----- Message -----  From: Jarret Marina  Sent: 4/19/2019   6:14 PM  To: Cedric Nurses  Subject: Non-Urgent Medical Question                      ----- Message from [address] [place] [address] [address], [facility] sent at 4/19/2019  6:14 PM EDT -----    Hello,  I am scheduled for an MRI on April 30th for my lumbar region. The last time I had an MRI I was in extreme back  pain from lying in the position needed for the procedure for the required amount of time. The tech was having problems with the results because I was inadvertently moving a little from the discomfort. Would it be possible to prescribe a muscle relaxer and/or pain medication to help me through the procedure? If so it can be called into [facility] in [place]. Thank you.

## 2019-05-08 DIAGNOSIS — M54.16 LUMBAR RADICULOPATHY: ICD-10-CM

## 2019-05-08 DIAGNOSIS — Z98.890 HX OF LAMINECTOMY: ICD-10-CM

## 2019-05-08 DIAGNOSIS — M51.26 PROTRUDED LUMBAR DISC: ICD-10-CM

## 2019-05-15 ENCOUNTER — OFFICE VISIT (OUTPATIENT)
Dept: ORTHOPEDIC SURGERY | Age: 64
End: 2019-05-15

## 2019-05-15 VITALS
BODY MASS INDEX: 36.3 KG/M2 | OXYGEN SATURATION: 98 % | HEART RATE: 76 BPM | TEMPERATURE: 97.8 F | WEIGHT: 268 LBS | DIASTOLIC BLOOD PRESSURE: 81 MMHG | SYSTOLIC BLOOD PRESSURE: 124 MMHG | RESPIRATION RATE: 16 BRPM | HEIGHT: 72 IN

## 2019-05-15 DIAGNOSIS — M54.16 LUMBAR RADICULOPATHY: ICD-10-CM

## 2019-05-15 DIAGNOSIS — M48.061 STENOSIS OF LATERAL RECESS OF LUMBAR SPINE: Primary | ICD-10-CM

## 2019-05-15 DIAGNOSIS — Z98.890 HX OF LAMINECTOMY: ICD-10-CM

## 2019-05-15 RX ORDER — CYCLOBENZAPRINE HCL 10 MG
5-10 TABLET ORAL
Qty: 30 TAB | Refills: 1 | Status: SHIPPED | OUTPATIENT
Start: 2019-05-15 | End: 2019-06-24 | Stop reason: SDUPTHER

## 2019-05-15 RX ORDER — CELECOXIB 100 MG/1
100 CAPSULE ORAL
COMMUNITY
Start: 2018-12-10 | End: 2019-07-26 | Stop reason: SDUPTHER

## 2019-05-15 NOTE — PROGRESS NOTES
[address], [address]  [place], [facility] [address]  Phone: [phone]  Fax: [phone]        Kipp Prader  : 1955  PCP: Cherrie Case MD    PROGRESS NOTE      ASSESSMENT AND PLAN    Diagnoses and all orders for this visit:    1. 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 -     REFERRAL TO PHYSICAL THERAPY    2. Lumbar radiculopathy  -     cyclobenzaprine (FLEXERIL) 10 mg tablet; Take 0.5-1 Tabs by mouth nightly as needed for Muscle Spasm(s). -     REFERRAL TO PHYSICAL THERAPY    3. Hx of laminectomy, LL4/5 Dr. Eva Dempsey, some LLE residuals  -     REFERRAL TO PHYSICAL THERAPY       1. Advised to continue HEP. Do ankle pumps. Take breaks every 2 hours in car trips. 2. Referral to PT  3. Discussed life style modification, PT, medication, spinal injection as treatment options   4. No indications for surgery at this time. 5. Continue Gabapentin 300mg TID. Last dose at dinner time  6. Take Flexeril 0.5-1 tab 10mg PRN QHS  7. Given information on chronic pain    F/U PRN      HISTORY OF PRESENT ILLNESS  Marina New is a 58 y.o. female. Pt presents to the office for a f/u visit for protruded lumbar disc and radiculopathy. Last visit pt was sent to have a lumbar spine MRI. Images reviewed with the pt. She notes her LLE pain is relieved well by Gabapentin. She tried using BID, but had increased pain and returned to TID. She c/o good and bad days with back pain in part due to activity level. She notes her low back pain is worse with bending and lifting. She notes her LLE has swelled often recently. She c/o nerve pain in her ankle more than the rest of her leg. She denies sleeping well, admits to waking often. L TKA about 9 months ago.     Since last visit: Pt reports L shoulder rotator cuff tear for which she will do PT. She's been started on Celebrex. She intends to begin with PT in [place] since they are traveling there and she loves this physical therapist. She leaves 5/22/19 for [place] for a wedding then will go to [place]. She comes back 9/2019. Location of pain: low back  Does pain radiate into extremities: LLE - controlled with Gabapentin. L knee pain and numbness from surgery  Does patient have weakness: no   Pt denies saddle paresthesias. Medications pt is on: Gabapentin 300mg TID with benefit, sexual side effect. Denies persistent fevers, chills, weight changes, neurogenic bowel or bladder symptoms. Treatments patient has tried:  Physical therapy:Yes  Doing HEP: Yes stretches low back, exercises L knee  Non-opioid medications: Yes  Spinal injections: Yes with complication - blood patch required. Spinal surgery- Yes. L L4-5 lami 2008 Dr. Gael Vu  Last L MRI 2019: L L3-4 lateral recess stenosis      reviewed. Pt is retired. PMHx of B/L knee replacement. Pain Assessment  5/15/2019   Location of Pain Back;Leg;Hip;Buttocks   Location Modifiers Left   Severity of Pain 1   Quality of Pain Dull;Aching;Burning   Quality of Pain Comment -   Duration of Pain Persistent   Frequency of Pain Intermittent   Aggravating Factors Bending;Stretching;Standing;Walking   Aggravating Factors Comment -   Limiting Behavior Some   Relieving Factors Rest;Nothing   Relieving Factors Comment Gabapentin    Result of Injury No         MRI lumbar spine 4/30/19  IMPRESSION:  1. Postsurgical changes at L4-5.  2. Mild central canal and moderate left lateral recess stenosis at L3-4.  3. Elsewhere, no high-grade central canal stenosis in the lumbar spine. 4. Multilevel degenerative changes and foraminal stenosis as above. Full report scanned in chart.       PAST MEDICAL HISTORY   Past Medical History:   Diagnosis Date    Arthritis     Chronic pain     Hx of laminectomy, LL4/5 Dr. Gael Vu, some LLE residuals 11/3/2017    Hypertension        Past Surgical History:   Procedure Laterality Date    HX GYN      HX KNEE REPLACEMENT Left 09/2018    HX KNEE REPLACEMENT Right     HX LUMBAR LAMINECTOMY Left     Laminectomy L4/5 Dr. Danial Saunders about 2008   . MEDICATIONS      Current Outpatient Medications   Medication Sig Dispense Refill    celecoxib (CELEBREX) 100 mg capsule Take 100 mg by mouth.  cyclobenzaprine (FLEXERIL) 10 mg tablet Take 0.5-1 Tabs by mouth nightly as needed for Muscle Spasm(s). 30 Tab 1    cetirizine (ZYRTEC) 10 mg tablet Take 10 mg by mouth.  gabapentin (NEURONTIN) 300 mg capsule Take 1 Cap by mouth three (3) times daily. 270 Cap 3    lisinopril (PRINIVIL, ZESTRIL) 5 mg tablet TK 1 T PO QD  0    metoprolol succinate (TOPROL-XL) 25 mg XL tablet TK 1 T PO QD  2    hydroCHLOROthiazide (HYDRODIURIL) 25 mg tablet       simvastatin (ZOCOR) 40 mg tablet Take 40 mg by mouth nightly. Indications: HYPERLIPIDEMIA      diazePAM (VALIUM) 10 mg tablet Take 1 tab po 30 minutes prior to MRI. Will needed  to and from MRI. 1 Tab 0    omeprazole (PRILOSEC OTC) 20 mg tablet Take 20 mg by mouth daily.  ondansetron (ZOFRAN ODT) 4 mg disintegrating tablet Take 1 Tab by mouth every eight (8) hours as needed for Nausea. 20 Tab 0    HYDROcodone-acetaminophen (NORCO) 5-325 mg per tablet TK 1 T PO BID PRN  0    predniSONE (DELTASONE) 10 mg tablet   0    PREPOPIK 10 mg-3.5 gram-12 gram pwpk USE AS DIRECTED  0    meloxicam (MOBIC) 15 mg tablet       lisinopril (PRINIVIL, ZESTRIL) 10 mg tablet Take 10 mg by mouth daily.  Indications: HYPERTENSION          ALLERGIES    Allergies   Allergen Reactions    Indocin [Indomethacin] Rash    Topamax [Topiramate] Other (comments)     Altered taste, couldn't eat          SOCIAL HISTORY    Social History     Socioeconomic History    Marital status:      Spouse name: Not on file    Number of children: Not on file    Years of education: Not on file    Highest education level: Not on file   Occupational History    Not on file   Social Needs    Financial resource strain: Not on file    Food insecurity:     Worry: Not on file     Inability: Not on file    Transportation needs:     Medical: Not on file     Non-medical: Not on file   Tobacco Use    Smoking status: Former Smoker    Smokeless tobacco: Former User   Substance and Sexual Activity    Alcohol use: Yes     Comment: Occasionally    Drug use: No    Sexual activity: Not on file   Lifestyle    Physical activity:     Days per week: Not on file     Minutes per session: Not on file    Stress: Not on file   Relationships    Social connections:     Talks on phone: Not on file     Gets together: Not on file     Attends Denominational service: Not on file     Active member of club or organization: Not on file     Attends meetings of clubs or organizations: Not on file     Relationship status: Not on file    Intimate partner violence:     Fear of current or ex partner: Not on file     Emotionally abused: Not on file     Physically abused: Not on file     Forced sexual activity: Not on file   Other Topics Concern    Not on file   Social History Narrative    Not on file     Socioeconomic History    Marital status:      Spouse name: Not on file    Number of children: Not on file    Years of education: Not on file    Highest education level: Not on file   Occupational History    Not on file   Social Needs    Financial resource strain: Not on file    Food insecurity:     Worry: Not on file     Inability: Not on file    Transportation needs:     Medical: Not on file     Non-medical: Not on file   Tobacco Use    Smoking status: Former Smoker    Smokeless tobacco: Former User   Substance and Sexual Activity    Alcohol use: Yes     Comment: Occasionally    Drug use: No    Sexual activity: Not on file   Lifestyle    Physical activity:     Days per week: Not on file     Minutes per session: Not on file    Stress: Not on file   Relationships    Social connections:     Talks on phone: Not on file     Gets together: Not on file     Attends Buddhism service: Not on file     Active member of club or organization: Not on file     Attends meetings of clubs or organizations: Not on file     Relationship status: Not on file    Intimate partner violence:     Fear of current or ex partner: Not on file     Emotionally abused: Not on file     Physically abused: Not on file     Forced sexual activity: Not on file   Other Topics Concern    Not on file   Social History Narrative    Not on file      Problem Relation Age of Onset    Hypertension Mother     Hypertension Father     Stroke Father     Hypertension Sister     Cancer Sister     Hypertension Brother     Alcohol abuse Brother     Arthritis-osteo Brother     Heart Disease Brother        REVIEW OF SYSTEMS  Review of Systems   Constitutional: Negative for chills, fever and weight loss. Respiratory: Negative for shortness of breath. Cardiovascular: Negative for chest pain. Gastrointestinal: Negative for constipation. Negative for fecal incontinence   Genitourinary: Negative for dysuria. Negative for urinary incontinence   Musculoskeletal: Positive for joint pain. Per HPI   Skin: Negative for rash. Neurological: Positive for tingling. Negative for dizziness, tremors, focal weakness and headaches. Endo/Heme/Allergies: Does not bruise/bleed easily. Psychiatric/Behavioral: The patient has insomnia. PHYSICAL EXAMINATION  Visit Vitals  /81   Pulse 76   Temp 97.8 °F (36.6 °C)   Resp 16   Ht 6' (1.829 m)   Wt 268 lb (121.6 kg)   SpO2 98%   BMI 36.35 kg/m²         Accompanied by spouse. Constitutional:  Well developed, well nourished, in no acute distress. Psychiatric: Affect and mood are appropriate. Integumentary: No rashes or abrasions noted on exposed areas. Cardiovascular/Peripheral Vascular: Intact l pulses.  No peripheral edema is noted BLE. Lymphatic:  No evidence of lymphedema. No cervical lymphadenopathy. SPINE/MUSCULOSKELETAL EXAM      Lumbar spine:  No rash, ecchymosis, or gross obliquity. No fasciculations. No focal atrophy is noted. Non tender to palpation lumbar spine. No tenderness to palpation at the sciatic notch. SI joints non-tender. Trochanter tender on L. Tenderness to palpation of L lateral and medial knee. MOTOR:       Hip Flex  Quads Hamstrings Ankle DF EHL Ankle PF   Right +4/5 +4/5 +4/5 +4/5 +4/5 +4/5   Left +4/5 +4/5 +4/5 +4/5 +4/5 +4/5     Straight Leg raise negative. Ambulation without assistive device. FWB. FF. Written by Christa Chavez, as dictated by Ilsa Goss MD.    I, Dr. Ilsa Goss MD, confirm that all documentation is accurate. Ms. Milan Abbott may have a reminder for a \"due or due soon\" health maintenance. I have asked that she contact her primary care provider for follow-up on this health maintenance.

## 2019-05-15 NOTE — PATIENT INSTRUCTIONS

## 2019-05-20 RX ORDER — OMEPRAZOLE 20 MG/1
20 TABLET, DELAYED RELEASE ORAL DAILY
COMMUNITY
End: 2019-06-03 | Stop reason: ALTCHOICE

## 2019-05-20 RX ORDER — MELOXICAM 15 MG/1
TABLET ORAL
COMMUNITY
Start: 2017-08-12 | End: 2019-06-11 | Stop reason: ALTCHOICE

## 2019-05-20 RX ORDER — AMOXICILLIN 250 MG
1 CAPSULE ORAL
COMMUNITY
Start: 2018-09-17 | End: 2019-06-03 | Stop reason: ALTCHOICE

## 2019-05-20 RX ORDER — DIAZEPAM 10 MG/1
TABLET ORAL
COMMUNITY
Start: 2019-04-23 | End: 2019-06-03 | Stop reason: ALTCHOICE

## 2019-05-20 RX ORDER — CYCLOBENZAPRINE HCL 10 MG
5-10 TABLET ORAL
COMMUNITY
Start: 2019-05-15 | End: 2020-07-02 | Stop reason: CLARIF

## 2019-06-03 ENCOUNTER — OFFICE VISIT (OUTPATIENT)
Dept: FAMILY MEDICINE | Age: 64
End: 2019-06-03

## 2019-06-03 ENCOUNTER — LAB SERVICES (OUTPATIENT)
Dept: LAB | Age: 64
End: 2019-06-03

## 2019-06-03 VITALS
DIASTOLIC BLOOD PRESSURE: 74 MMHG | BODY MASS INDEX: 36.13 KG/M2 | WEIGHT: 266.76 LBS | HEIGHT: 72 IN | SYSTOLIC BLOOD PRESSURE: 120 MMHG | RESPIRATION RATE: 12 BRPM | HEART RATE: 74 BPM | TEMPERATURE: 98.3 F

## 2019-06-03 DIAGNOSIS — N39.0 RECURRENT UTI: ICD-10-CM

## 2019-06-03 DIAGNOSIS — Z01.419 ENCOUNTER FOR GYNECOLOGICAL EXAMINATION WITHOUT ABNORMAL FINDING: ICD-10-CM

## 2019-06-03 DIAGNOSIS — M48.07 STENOSIS OF LATERAL RECESS OF LUMBOSACRAL SPINE: ICD-10-CM

## 2019-06-03 DIAGNOSIS — Z00.00 MEDICARE WELCOME VISIT: Primary | ICD-10-CM

## 2019-06-03 DIAGNOSIS — Z78.0 POSTMENOPAUSAL STATUS (AGE-RELATED) (NATURAL): ICD-10-CM

## 2019-06-03 DIAGNOSIS — R79.89 ABNORMAL TSH: ICD-10-CM

## 2019-06-03 DIAGNOSIS — Z11.51 SCREENING FOR HPV (HUMAN PAPILLOMAVIRUS): ICD-10-CM

## 2019-06-03 DIAGNOSIS — E78.2 MIXED HYPERLIPIDEMIA: ICD-10-CM

## 2019-06-03 DIAGNOSIS — I10 ESSENTIAL HYPERTENSION: ICD-10-CM

## 2019-06-03 DIAGNOSIS — Z11.59 NEED FOR HEPATITIS C SCREENING TEST: ICD-10-CM

## 2019-06-03 DIAGNOSIS — R53.83 FATIGUE, UNSPECIFIED TYPE: ICD-10-CM

## 2019-06-03 DIAGNOSIS — M12.9 ARTHRITIS INVOLVING MULTIPLE SITES: ICD-10-CM

## 2019-06-03 DIAGNOSIS — Z13.6 SCREENING FOR ISCHEMIC HEART DISEASE (IHD): ICD-10-CM

## 2019-06-03 LAB
PR-INTERVAL (MSEC): 178
QRS-INTERVAL (MSEC): 84
QTC: 426
VENTRICULAR RATE EKG/MIN (BPM): 74

## 2019-06-03 PROCEDURE — G0403 EKG FOR INITIAL PREVENT EXAM: HCPCS | Performed by: FAMILY MEDICINE

## 2019-06-03 RX ORDER — CEPHALEXIN 250 MG/1
CAPSULE ORAL
Qty: 30 CAPSULE | Refills: 1 | Status: SHIPPED | OUTPATIENT
Start: 2019-06-03 | End: 2019-12-03 | Stop reason: SDUPTHER

## 2019-06-03 RX ORDER — TRAMADOL HYDROCHLORIDE 50 MG/1
50 TABLET ORAL EVERY 6 HOURS PRN
COMMUNITY
End: 2020-07-02 | Stop reason: CLARIF

## 2019-06-03 RX ORDER — TRAMADOL HYDROCHLORIDE 50 MG/1
50 TABLET ORAL EVERY 6 HOURS PRN
Qty: 30 TABLET | Status: CANCELLED | OUTPATIENT
Start: 2019-06-03

## 2019-06-03 RX ORDER — HYDROCHLOROTHIAZIDE 50 MG/1
50 TABLET ORAL DAILY
Qty: 90 TABLET | Refills: 1 | Status: SHIPPED | OUTPATIENT
Start: 2019-06-03 | End: 2019-12-01 | Stop reason: SDUPTHER

## 2019-06-03 RX ORDER — CELECOXIB 100 MG/1
100 CAPSULE ORAL DAILY
Qty: 90 CAPSULE | Refills: 0 | Status: SHIPPED | OUTPATIENT
Start: 2019-06-03 | End: 2019-09-18 | Stop reason: SDUPTHER

## 2019-06-03 ASSESSMENT — PATIENT HEALTH QUESTIONNAIRE - PHQ9
SUM OF ALL RESPONSES TO PHQ9 QUESTIONS 1 AND 2: 0
2. FEELING DOWN, DEPRESSED OR HOPELESS: NOT AT ALL
1. LITTLE INTEREST OR PLEASURE IN DOING THINGS: NOT AT ALL
SUM OF ALL RESPONSES TO PHQ9 QUESTIONS 1 AND 2: 0

## 2019-06-04 LAB
ANION GAP SERPL CALC-SCNC: 11 MMOL/L (ref 10–20)
BUN SERPL-MCNC: 13 MG/DL (ref 6–20)
BUN/CREAT SERPL: 18 (ref 7–25)
CALCIUM SERPL-MCNC: 9.9 MG/DL (ref 8.4–10.2)
CHLORIDE SERPL-SCNC: 101 MMOL/L (ref 98–107)
CHOLEST SERPL-MCNC: 185 MG/DL
CHOLEST/HDLC SERPL: 3.1 {RATIO}
CO2 SERPL-SCNC: 28 MMOL/L (ref 21–32)
CREAT SERPL-MCNC: 0.72 MG/DL (ref 0.51–0.95)
FASTING STATUS PATIENT QL REPORTED: 6 HRS
GLUCOSE SERPL-MCNC: 100 MG/DL (ref 65–99)
HCV AB SER QL: NEGATIVE
HDLC SERPL-MCNC: 59 MG/DL
LDLC SERPL-MCNC: 91 MG/DL
LENGTH OF FAST TIME PATIENT: 6 HRS
NONHDLC SERPL-MCNC: 126 MG/DL
POTASSIUM SERPL-SCNC: 3.8 MMOL/L (ref 3.4–5.1)
SODIUM SERPL-SCNC: 136 MMOL/L (ref 135–145)
TRIGL SERPL-MCNC: 177 MG/DL
TSH SERPL-ACNC: 2.76 MCUNITS/ML (ref 0.35–5)

## 2019-06-04 ASSESSMENT — MONTREAL COGNITIVE ASSESSMENT (MOCA): WHAT IS THE TOTAL SCORE (OUT OF 30): 28

## 2019-06-06 LAB — HPV16+18+45 E6+E7MRNA CVX NAA+PROBE: NORMAL

## 2019-07-09 ENCOUNTER — CLERICAL ORDERS (OUTPATIENT)
Dept: ADMINISTRATIVE | Age: 64
End: 2019-07-09

## 2019-07-09 DIAGNOSIS — Z12.31 VISIT FOR SCREENING MAMMOGRAM: Primary | ICD-10-CM

## 2019-07-12 DIAGNOSIS — E78.2 MIXED HYPERLIPIDEMIA: ICD-10-CM

## 2019-07-12 RX ORDER — SIMVASTATIN 40 MG
TABLET ORAL
Qty: 90 TABLET | Refills: 1 | Status: SHIPPED | OUTPATIENT
Start: 2019-07-12 | End: 2020-03-10 | Stop reason: SDUPTHER

## 2019-07-17 ENCOUNTER — HOSPITAL ENCOUNTER (OUTPATIENT)
Dept: MAMMOGRAPHY | Age: 64
Discharge: HOME | End: 2019-07-17
Attending: FAMILY MEDICINE

## 2019-07-17 DIAGNOSIS — Z78.0 POSTMENOPAUSAL STATUS (AGE-RELATED) (NATURAL): ICD-10-CM

## 2019-07-17 DIAGNOSIS — Z12.31 VISIT FOR SCREENING MAMMOGRAM: ICD-10-CM

## 2019-07-17 PROCEDURE — 77080 DXA BONE DENSITY AXIAL: CPT | Performed by: RADIOLOGY

## 2019-07-17 PROCEDURE — 77063 BREAST TOMOSYNTHESIS BI: CPT

## 2019-07-17 PROCEDURE — 77080 DXA BONE DENSITY AXIAL: CPT

## 2019-07-17 PROCEDURE — 77063 BREAST TOMOSYNTHESIS BI: CPT | Performed by: RADIOLOGY

## 2019-07-17 PROCEDURE — 77067 SCR MAMMO BI INCL CAD: CPT | Performed by: RADIOLOGY

## 2019-07-17 PROCEDURE — 3342F MAMMO ASSESS BENGN DOCD: CPT | Performed by: RADIOLOGY

## 2019-07-26 ENCOUNTER — PATIENT MESSAGE (OUTPATIENT)
Dept: ORTHOPEDIC SURGERY | Age: 64
End: 2019-07-26

## 2019-07-26 DIAGNOSIS — M48.061 STENOSIS OF LATERAL RECESS OF LUMBAR SPINE: ICD-10-CM

## 2019-07-26 DIAGNOSIS — M54.16 LUMBAR RADICULOPATHY: ICD-10-CM

## 2019-07-26 DIAGNOSIS — Z98.890 HX OF LAMINECTOMY: ICD-10-CM

## 2019-07-26 RX ORDER — CYCLOBENZAPRINE HCL 10 MG
TABLET ORAL
Qty: 45 TAB | Refills: 0 | Status: SHIPPED | OUTPATIENT
Start: 2019-07-26 | End: 2019-10-10

## 2019-07-26 RX ORDER — GABAPENTIN 300 MG/1
600 CAPSULE ORAL 3 TIMES DAILY
Qty: 270 CAP | Refills: 0 | OUTPATIENT
Start: 2019-07-26 | End: 2019-10-30 | Stop reason: DRUGHIGH

## 2019-07-26 RX ORDER — CELECOXIB 100 MG/1
100 CAPSULE ORAL DAILY
Qty: 45 CAP | Refills: 0 | Status: SHIPPED | OUTPATIENT
Start: 2019-07-26 | End: 2020-02-17

## 2019-07-26 NOTE — TELEPHONE ENCOUNTER
From: Sharlene Kelly  To: Alyssa Chopra MD  Sent: 7/26/2019 2:43 PM EDT  Subject: Lc Claudio,  I am in [place] for another month and a half and need a refill on my medications. I am also having more lower back discomfort with an increase in my activity level. I have been trying to walk more, and have been standing more,and working. I am taking 300 mg of gabepentin, cyclobenzaprine 10 mg tablet  and celecoxib 100 mg capsule. Is it possible to increase my gabepentin , and refill my cyclobenzaprine and celecoxib? [facility] [place]. The phone number is [phone]. Thank you for your attention to this matter.

## 2019-07-29 NOTE — TELEPHONE ENCOUNTER
Verbal order per NP Dallin message: Phoned in Gabapentin 300mg take 2 caps po TID. Disp 270 no refills. Patient made aware, no further actions needed at this time.

## 2019-09-16 ENCOUNTER — E-ADVICE (OUTPATIENT)
Dept: FAMILY MEDICINE | Age: 64
End: 2019-09-16

## 2019-09-16 DIAGNOSIS — M12.9 ARTHRITIS INVOLVING MULTIPLE SITES: ICD-10-CM

## 2019-09-16 DIAGNOSIS — M48.07 STENOSIS OF LATERAL RECESS OF LUMBOSACRAL SPINE: ICD-10-CM

## 2019-09-18 RX ORDER — CELECOXIB 200 MG/1
200 CAPSULE ORAL DAILY
Qty: 90 CAPSULE | Refills: 1 | Status: SHIPPED | COMMUNITY
Start: 2019-09-18 | End: 2019-09-19 | Stop reason: SDUPTHER

## 2019-09-19 RX ORDER — CELECOXIB 200 MG/1
200 CAPSULE ORAL DAILY
Qty: 90 CAPSULE | Refills: 1 | Status: SHIPPED | OUTPATIENT
Start: 2019-09-19 | End: 2020-03-16

## 2019-10-24 ENCOUNTER — PATIENT MESSAGE (OUTPATIENT)
Dept: ORTHOPEDIC SURGERY | Age: 64
End: 2019-10-24

## 2019-10-24 RX ORDER — CYCLOBENZAPRINE HCL 10 MG
5-10 TABLET ORAL
Qty: 30 TAB | Refills: 0 | Status: SHIPPED | OUTPATIENT
Start: 2019-10-24 | End: 2019-10-28 | Stop reason: DRUGHIGH

## 2019-10-24 NOTE — TELEPHONE ENCOUNTER
Patient is requesting a refill of Flexeril medication. She is reporting increased right lower back pain and spasms and has been scheduled for follow up appt 10/30. Please provide refill for patient and advise when ready at [phone].

## 2019-10-24 NOTE — TELEPHONE ENCOUNTER
Last Visit: 5/15/19 with MD David Munson  Next Appointment: 10/30/19 with MD David Munson  Previous Refill Encounter(s): 7/26/19 #45    Requested Prescriptions     Pending Prescriptions Disp Refills    cyclobenzaprine (FLEXERIL) 10 mg tablet 30 Tab 0     Sig: Take 0.5-1 Tabs by mouth nightly as needed for Muscle Spasm(s).

## 2019-10-28 RX ORDER — CYCLOBENZAPRINE HCL 10 MG
10 TABLET ORAL
Qty: 60 TAB | Refills: 0 | Status: SHIPPED | OUTPATIENT
Start: 2019-10-28 | End: 2019-10-30 | Stop reason: SDUPTHER

## 2019-10-28 NOTE — TELEPHONE ENCOUNTER
Spoke with Pharmacy Technician, she stated that a new Rx needed to be sent with the order change. The refill amount was only for 30 tabs, and if patient is taking BID now she would need 60. Verbal order per NP Dallin message: Flexeril 10mg take 1 tab po BID prn.  Disp 60 no refills

## 2019-10-28 NOTE — TELEPHONE ENCOUNTER
----- Message from Liborio Khanna NP sent at 10/24/2019 12:34 PM EDT -----  Regarding: FW: Prescription Question  Contact: [phone]  Please call pharmacy, ok to fill. Please change SIG to 1 BID PRN. Has FU 10/30.   ----- Message -----  From: Seth Freed  Sent: 10/24/2019  12:29 PM EDT  To: [facility]es  Subject: Prescription Question                            My prescription  for a muscle relaxer was just refilled. Thank you. However my pharmacy won't refill it till next Tuesday. I am in a great deal of pain with severe muscle spasms and have an appointment with you next Wednesday. I have been in so much pain I've had to take more than 1 capsule a day as directed on the medicine dosage directions. I will run out before Tuesday. The pharmacy said if the directions are changed for taking the Flexirol they can refill it sooner. These spasms are so severe I will probably need more than 1 Flexirol a day to be able to function at all before I see you on Wednesday. Could you possibly send in another prescription for more than 1 per day? Thank you.    Cherrie Mccarthy

## 2019-10-30 ENCOUNTER — OFFICE VISIT (OUTPATIENT)
Dept: ORTHOPEDIC SURGERY | Age: 64
End: 2019-10-30

## 2019-10-30 VITALS
SYSTOLIC BLOOD PRESSURE: 128 MMHG | TEMPERATURE: 98 F | BODY MASS INDEX: 38.74 KG/M2 | HEART RATE: 94 BPM | OXYGEN SATURATION: 98 % | WEIGHT: 286 LBS | DIASTOLIC BLOOD PRESSURE: 76 MMHG | HEIGHT: 72 IN

## 2019-10-30 DIAGNOSIS — Z98.890 HX OF LAMINECTOMY: ICD-10-CM

## 2019-10-30 DIAGNOSIS — M48.061 STENOSIS OF LATERAL RECESS OF LUMBAR SPINE: ICD-10-CM

## 2019-10-30 DIAGNOSIS — M43.16 SPONDYLOLISTHESIS AT L3-L4 LEVEL: Primary | ICD-10-CM

## 2019-10-30 RX ORDER — METHYLPREDNISOLONE 4 MG/1
TABLET ORAL
Qty: 1 DOSE PACK | Refills: 0 | Status: SHIPPED | OUTPATIENT
Start: 2019-10-30 | End: 2020-02-07

## 2019-10-30 RX ORDER — GABAPENTIN 600 MG/1
600 TABLET ORAL 3 TIMES DAILY
Qty: 270 TAB | Refills: 1 | Status: SHIPPED | OUTPATIENT
Start: 2019-10-30

## 2019-10-30 RX ORDER — NAPROXEN SODIUM 220 MG
220 TABLET ORAL 2 TIMES DAILY WITH MEALS
COMMUNITY
End: 2020-02-07

## 2019-10-30 RX ORDER — CYCLOBENZAPRINE HCL 10 MG
10 TABLET ORAL
Qty: 60 TAB | Refills: 2 | Status: SHIPPED | OUTPATIENT
Start: 2019-10-30

## 2019-10-30 NOTE — PROGRESS NOTES
[address]  [address], [address]  [place], [facility] [address]  Phone: [phone]  Fax: [phone]        Lynda Larkin  : 1955  PCP: Frank Walsh MD    PROGRESS NOTE      ASSESSMENT AND PLAN    Diagnoses and all orders for this visit:    1. Spondylolisthesis at L3-L4 level  -     cyclobenzaprine (FLEXERIL) 10 mg tablet; Take 1 Tab by mouth three (3) times daily (with meals). -     MRI LUMB SPINE WO CONT; Future  -     AMB SUPPLY ORDER  -     REFERRAL TO PHYSICAL THERAPY  -     methylPREDNISolone (MEDROL DOSEPACK) 4 mg tablet; Take as directed    2. Hx of laminectomy, LL4/5 Dr. Sea Ramirez, some LLE residuals  -     AMB POC XRAY, SPINE, LUMBOSACRAL; 4+  -     MRI LUMB SPINE WO CONT; Future    3. 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 Max Daily Amount: 1,800 mg. Indications: Neuropathic Pain  -     REFERRAL TO PHYSICAL THERAPY       1. Advised to continue HEP. 2. Discussed life style modification, PT, medication, and surgery as treatment options   3. Rx for MDP  4. MRI lumbar spine - increased low back and LLE pain x 3 month. New listhesis L3-4.  5. Rx for LSO. Discussed appropriate use. 6. Referral to PT  7. Continue Flexeril 10mg 0.5-1 tab PRN, 1-2 tab PRN QHS  8. Given information on DDD    F/U after MRI with Dr. Pat Vega or Dr. Sea Ramirez for surgical eval    Patient was prescribed a LSO which is medically necessary for the above diagnoses. This orthosis is required for the following reason(s):   1. To reduce pain by restrictions mobility of the trunk - YES,   2. To otherwise support weak spinal muscles and/or deformed spine. - YES. HISTORY OF PRESENT ILLNESS  King Guadarrama is a 71 y.o. female. Pt last evaluated 2019 for stenosis. Referred to PT at that time and increased Flexeril. She states her back and legs have both been worsening.  She complains now has sciatica on the R. Pt notes last week was the worst in terms of spasms. She describes the pain as ripping instead of pulling. She c/o constant low back pain. Pt reports difficulty rising to a stand and picking up objects off the ground. She reports 2-3 minutes of walking tolerance before she feels weak in BLE. Pt reports most pain with moving in bed, so she has not been sleeping well. Denies falls since 7/4/19. She notes she has a dry mouth. Pt notes she had some trouble while visiting [place] and [place]. Location of pain: low back  Does pain radiate into extremities: LLE medial thigh to foot >RLE. L knee pain and numbness from surgery  Does patient have weakness: BLE with walking  Pt denies saddle paresthesias.    Medications pt is on: Gabapentin 600mg TID with little benefit, sexual side effect. Flexeril 10mg PRN. Denies persistent fevers, chills, weight changes, neurogenic bowel or bladder symptoms. Treatments patient has tried:  Physical therapy:Yes  Doing HEP: Yes stretches low back, exercises L knee - less due to pain  Non-opioid medications: Yes  Spinal injections: Yes with complication - blood patch required. Spinal surgery- Yes. L L4-5 lami 2008 Dr. Gera Nagy  Last L MRI 2019: L L3-4 lateral recess stenosis     reviewed. Pt is retired.  was a nurse. PMHx of B/L knee replacement. ED 10/10/19 for chest pain. Pain Assessment  10/30/2019   Location of Pain Back;Leg   Location Modifiers Left   Severity of Pain 6   Quality of Pain Sharp; Other (Comment)   Quality of Pain Comment -   Duration of Pain -   Frequency of Pain Intermittent   Aggravating Factors -   Aggravating Factors Comment -   Limiting Behavior -   Relieving Factors -   Relieving Factors Comment -   Result of Injury -       PAST MEDICAL HISTORY   Past Medical History:   Diagnosis Date    Arthritis     Chronic pain     Hx of laminectomy, LL4/5 Dr. Gera Nagy, some LLE residuals 11/3/2017    Hyperlipidemia  Hypertension        Past Surgical History:   Procedure Laterality Date    HX APPENDECTOMY      HX CHOLECYSTECTOMY      HX GYN      HX HYSTERECTOMY      HX KNEE REPLACEMENT Left 09/2018    HX KNEE REPLACEMENT Right     HX LUMBAR LAMINECTOMY Left     Laminectomy L4/5 Dr. Wild Joie about 2008   . MEDICATIONS      Current Outpatient Medications   Medication Sig Dispense Refill    folic acid/multivit-min/lutein (CENTRUM SILVER PO) Take  by mouth.  naproxen sodium (ALEVE) 220 mg tablet Take 220 mg by mouth two (2) times daily (with meals).  cyclobenzaprine (FLEXERIL) 10 mg tablet Take 1 Tab by mouth three (3) times daily (with meals). 60 Tab 2    gabapentin (NEURONTIN) 600 mg tablet Take 1 Tab by mouth three (3) times daily. Max Daily Amount: 1,800 mg. Indications: Neuropathic Pain 270 Tab 1    methylPREDNISolone (MEDROL DOSEPACK) 4 mg tablet Take as directed 1 Dose Pack 0    simvastatin (ZOCOR) 40 mg tablet Take 40 mg by mouth nightly.  celecoxib (CELEBREX) 100 mg capsule Take 1 Cap by mouth daily. (Patient taking differently: Take  by mouth daily.) 45 Cap 0    cetirizine (ZYRTEC) 10 mg tablet Take 10 mg by mouth.  lisinopril (PRINIVIL, ZESTRIL) 5 mg tablet TK 1 T PO QD  0    metoprolol succinate (TOPROL-XL) 25 mg XL tablet TK 1 T PO QD  2    hydroCHLOROthiazide (HYDRODIURIL) 25 mg tablet daily.           ALLERGIES    Allergies   Allergen Reactions    Indocin [Indomethacin] Rash    Topamax [Topiramate] Other (comments)     Altered taste, couldn't eat          SOCIAL HISTORY    Social History     Socioeconomic History    Marital status:      Spouse name: Not on file    Number of children: Not on file    Years of education: Not on file    Highest education level: Not on file   Occupational History    Not on file   Social Needs    Financial resource strain: Not on file    Food insecurity:     Worry: Not on file     Inability: Not on file    Transportation needs: Medical: Not on file     Non-medical: Not on file   Tobacco Use    Smoking status: Former Smoker    Smokeless tobacco: Former User   Substance and Sexual Activity    Alcohol use: Yes     Comment: Occasionally    Drug use: No    Sexual activity: Not on file   Lifestyle    Physical activity:     Days per week: Not on file     Minutes per session: Not on file    Stress: Not on file   Relationships    Social connections:     Talks on phone: Not on file     Gets together: Not on file     Attends [employer] service: Not on file     Active member of club or organization: Not on file     Attends meetings of clubs or organizations: Not on file     Relationship status: Not on file    Intimate partner violence:     Fear of current or ex partner: Not on file     Emotionally abused: Not on file     Physically abused: Not on file     Forced sexual activity: Not on file   Other Topics Concern    Not on file   Social History Narrative    Not on file     Socioeconomic History    Marital status:      Spouse name: Not on file    Number of children: Not on file    Years of education: Not on file    Highest education level: Not on file   Occupational History    Not on file   Social Needs    Financial resource strain: Not on file    Food insecurity:     Worry: Not on file     Inability: Not on file    Transportation needs:     Medical: Not on file     Non-medical: Not on file   Tobacco Use    Smoking status: Former Smoker    Smokeless tobacco: Former User   Substance and Sexual Activity    Alcohol use: Yes     Comment: Occasionally    Drug use: No    Sexual activity: Not on file   Lifestyle    Physical activity:     Days per week: Not on file     Minutes per session: Not on file    Stress: Not on file   Relationships    Social connections:     Talks on phone: Not on file     Gets together: Not on file     Attends [employer] service: Not on file     Active member of club or organization: Not on file Attends meetings of clubs or organizations: Not on file     Relationship status: Not on file    Intimate partner violence:     Fear of current or ex partner: Not on file     Emotionally abused: Not on file     Physically abused: Not on file     Forced sexual activity: Not on file   Other Topics Concern    Not on file   Social History Narrative    Not on file      Problem Relation Age of Onset    Hypertension Mother     Hypertension Father     Stroke Father     Hypertension Sister     Cancer Sister     Hypertension Brother     Alcohol abuse Brother     Arthritis-osteo Brother     Heart Disease Brother        REVIEW OF SYSTEMS  Review of Systems   Constitutional: Negative for chills, fever and weight loss. Respiratory: Negative for shortness of breath. Cardiovascular: Negative for chest pain. Gastrointestinal: Negative for constipation. Negative for fecal incontinence   Genitourinary: Negative for dysuria. Negative for urinary incontinence   Musculoskeletal: Positive for back pain. Per HPI   Skin: Negative for rash. Neurological: Positive for tingling. Negative for dizziness, tremors, focal weakness and headaches. Endo/Heme/Allergies: Does not bruise/bleed easily. Psychiatric/Behavioral: The patient has insomnia. PHYSICAL EXAMINATION  Visit Vitals  /76 (BP 1 Location: Left arm, BP Patient Position: Sitting)   Pulse 94   Temp 98 °F (36.7 °C) (Oral)   Ht 6' (1.829 m)   Wt 286 lb (129.7 kg)   SpO2 98%   BMI 38.79 kg/m²         Accompanied by spouse. Constitutional:  Well developed, well nourished, in no acute distress. Psychiatric: Affect and mood are appropriate. Integumentary: No rashes or abrasions noted on exposed areas. Cardiovascular/Peripheral Vascular: No peripheral edema is noted BLE. SPINE/MUSCULOSKELETAL EXAM      Lumbar spine:  No rash, ecchymosis. Mild elevation of R pelvis compared to L. No fasciculations.  No focal atrophy is noted. Pain with lumbar extension and lateral bending. Tenderness to palpation L3-4, L sciatic notch. Diffuse Tenderness to palpation of lumbar spine. SI joints non-tender. Trochanters non tender. MOTOR:       Hip Flex  Quads Hamstrings Ankle DF EHL Ankle PF   Right +4/5 +4/5 +4/5 +4/5 +4/5 +4/5   Left +4/5 +4/5 +4/5 +4/5 +4/5 +4/5       Straight Leg raise negative. Ambulation without assistive device, antalgic. FWB. FF.    RADIOGRAPHS  Lumbar spine xray films reviewed:  1) grade 1 listhesis L3-4  2) disc space narrowing L4-5> L5-S1  3) no instability    Written by Giovany Kaufman, as dictated by Linette Everett MD.    I, Dr. Linette Everett MD, confirm that all documentation is accurate. Ms. Mary Timmons may have a reminder for a \"due or due soon\" health maintenance. I have asked that she contact her primary care provider for follow-up on this health maintenance.

## 2019-10-30 NOTE — PATIENT INSTRUCTIONS
Learning About Degenerative Disc Disease  What is degenerative disc disease? Degenerative disc disease is not really a disease. It's a term used to describe the normal changes in your spinal discs as you age. Spinal discs are small, spongy discs that separate the bones (vertebrae) that make up the spine. The discs act as shock absorbers for the spine, so it can flex, bend, and twist.  Degenerative disc disease can take place in one or more places along the spine. It most often occurs in the discs in the lower back and the neck. What causes it? As we age, our spinal discs break down, or degenerate. This breakdown causes the symptoms of degenerative disc disease in some people. When the discs break down, they can lose fluid and dry out, and their outer layers can have tiny cracks or tears. This leads to less padding and less space between the bones in the spine. The body reacts to this by making bony growths on the spine called bone spurs. These spurs can press on the spinal nerve roots or spinal cord. This can cause pain and can affect how well the nerves work. What are the symptoms? Many people with degenerative disc disease have no pain. But others have severe pain or other symptoms that limit their activities. Some of the most common symptoms are:  · Pain in the back or neck. Where the pain occurs depends on which discs are affected. · Pain that gets worse when you move, such as bending over, reaching up, or twisting. · Pain that may occur in the rear end (buttocks), arm, or leg if a nerve is pinched. · Numbness or tingling in your arm or leg. How is it diagnosed? A doctor can often diagnose degenerative disc disease while doing a physical exam. If your exam shows no signs of a serious condition, imaging tests (such as an X-ray) aren't likely to help your doctor find the cause of your symptoms.   Sometimes degenerative disc disease is found when an X-ray is taken for another reason, such as an injury or other health problem. But even if the doctor finds degenerative disc disease, that doesn't always mean that you will have symptoms. How is it treated? There are several things you can do at home to manage pain from this problem. · To relieve pain, use ice or heat (whichever feels better) on the affected area. ? Put ice or a cold pack on the area for 10 to 20 minutes at a time. Put a thin cloth between the ice and your skin. ? Put a warm water bottle, a heating pad set on low, or a warm cloth on your back. Put a thin cloth between the heating pad and your skin. Do not go to sleep with a heating pad on your skin. · Ask your doctor if you can take acetaminophen (such as Tylenol) or nonsteroidal anti-inflammatory drugs, such as ibuprofen or naproxen. Your doctor can prescribe stronger medicines if needed. Be safe with medicines. Read and follow all instructions on the label. · Get some exercise every day. Exercise is one of the best ways to help your back feel better and stay better. It's best to start each exercise slowly. You may notice a little soreness, and that's okay. But if an exercise makes your pain worse, stop doing it. Here are things you can try:  ? Walking. It's the simplest and maybe the best activity for your back. It gets your blood moving and helps your muscles stay strong. ? Exercises that gently stretch and strengthen your stomach, back, and leg muscles. The stronger those muscles are, the better they're able to protect your back. If you have constant or severe pain in your back or spine, you may need other treatments, such as physical therapy. In some cases, your doctor may suggest surgery. Follow-up care is a key part of your treatment and safety. Be sure to make and go to all appointments, and call your doctor if you are having problems. It's also a good idea to know your test results and keep a list of the medicines you take. Where can you learn more?   Go to http://jv-merly.info/. Enter A335 in the search box to learn more about \"Learning About Degenerative Disc Disease. \"  Current as of: June 26, 2019  Content Version: 12.2  © 5766-0870 [employer]. Care instructions adapted under license by [employer] (which disclaims liability or warranty for this information). If you have questions about a medical condition or this instruction, always ask your healthcare professional. [place] [address] any warranty or liability for your use of this information.

## 2019-10-30 NOTE — PROGRESS NOTES
Verbal order entered per Dr. Tristian Jesus as documented on blue sheet:Referral to PT. DME generic supply for LSO order given. MRI L-spine 3 months increased LBP, B/L LE, new listhesis L3-L4. Poss sx. MDP take as directed.

## 2019-11-06 ENCOUNTER — HOSPITAL ENCOUNTER (OUTPATIENT)
Age: 64
Discharge: HOME | End: 2019-11-06
Attending: PHYSICAL MEDICINE & REHABILITATION
Payer: MEDICARE

## 2019-11-06 DIAGNOSIS — M43.16 SPONDYLOLISTHESIS AT L3-L4 LEVEL: ICD-10-CM

## 2019-11-06 DIAGNOSIS — Z98.890 HX OF LAMINECTOMY: ICD-10-CM

## 2019-11-06 PROCEDURE — 72148 MRI LUMBAR SPINE W/O DYE: CPT

## 2019-11-18 ENCOUNTER — HOSPITAL ENCOUNTER (OUTPATIENT)
Dept: PHYSICAL THERAPY | Age: 64
Discharge: HOME | End: 2019-11-18
Payer: MEDICARE

## 2019-11-18 PROCEDURE — 97110 THERAPEUTIC EXERCISES: CPT

## 2019-11-18 PROCEDURE — 97162 PT EVAL MOD COMPLEX 30 MIN: CPT

## 2019-11-18 NOTE — PROGRESS NOTES
PHYSICAL THERAPY - DAILY TREATMENT NOTE    Patient Name: Raymundo Watkins        Date: 2019  : 1955   yes Patient  Verified  Visit #:   1     Insurance: Payor: [place] MEDICARE / Plan: [place] MEDICARE PART A & B / Product Type: Medicare /      In time: 1:40 Out time: 2:30   Total Treatment Time: 50     Medicare/BCBS Time Tracking (below)   Total Timed Codes (min):  50 1:1 Treatment Time:  50     TREATMENT AREA =  Low back pain [M54.5]    SUBJECTIVE  Pain Level (on 0 to 10 scale):  3-4  / 10   Medication Changes/New allergies or changes in medical history, any new surgeries or procedures?    no  If yes, update Summary List   Subjective Functional Status/Changes:  []  No changes reported     See POC          OBJECTIVE    See exam on chart for details on objective findings      12 min Therapeutic Exercise:  [x]  See flow sheet   Rationale:      increase ROM and increase strength to improve the patients ability to change/maintain body positions         Billed With/As:   [x] TE   [] TA   [] Neuro   [] Self Care Patient Education: [x] Review HEP    [] Progressed/Changed HEP based on:   [] positioning   [] body mechanics   [] transfers   [] heat/ice application    [] other:      Other Objective/Functional Measures:    See POC     Post Treatment Pain Level (on 0 to 10) scale:   5  / 10     ASSESSMENT  Assessment/Changes in Function:     See POC     []  See Progress Note/Recertification   Patient will continue to benefit from skilled PT services to modify and progress therapeutic interventions, address functional mobility deficits, address ROM deficits, address strength deficits, analyze and address soft tissue restrictions, analyze and cue movement patterns, analyze and modify body mechanics/ergonomics, assess and modify postural abnormalities and instruct in home and community integration to attain remaining goals.    Progress toward goals / Updated goals:    See POC     PLAN  []  Upgrade activities as tolerated yes Continue plan of care   []  Discharge due to :    []  Other:      Therapist: Deysi Kiser.  Iker Dixon, PT    Date: 11/18/2019 Time: 11:26 AM     Future Appointments   Date Time Provider Mina Ramos   12/3/2019  3:30 PM Triston Schaefferon., PT [facility]   12/5/2019  3:00 PM Triston Roa., PT [facility]   12/10/2019  2:45 PM Triston Roa., PT [facility]   12/12/2019  2:15 PM Triston Roa., PT [facility]   12/17/2019  2:45 PM Triston Roa., PT [facility]   12/19/2019  2:15 PM Triston Roa., PT [facility]   12/31/2019  2:45 PM Triston Roa., PT [facility]

## 2019-11-18 NOTE — PROGRESS NOTES
[address] [facility] AT [address], [address] [place] [address] Road  Phone: [phone]   [phone]  PLAN OF CARE / [address] [place] [address] PHYSICAL THERAPY SERVICES  Patient Name: Glenn Blunt : 1955   Medical   Diagnosis: Low back pain [M54.5] Treatment Diagnosis: Low back pain [M54.5]   Onset Date:      Referral Source: Eduar Leyva MD [facility]): 2019   Prior Hospitalization: See medical history Provider #: [id number]   Prior Level of Function: Limited tolerance for walking, stairs, standing due to L knee pain. Comorbidities: Lumbar laminectomy , arthritis (OA), HTN, L TKA 2018, R TKA 2012, BMI>30   Medications: Verified on Patient Summary List   The Plan of Care and following information is based on the information from the initial evaluation.   ===========================================================================================  Assessment / key information:  Pt is a 71y.o. year old female with subjective complaints of insidious, chronic LBP. Pt denies red flags. Current pain is rated as 1 to 8/10; localized primarily to central low back with intermittent pain/numbness/tingling down LLE to foot, RLE to mid thigh. Pt has been seen by ortho with MRI (+) L5 nerve root compression from central disc extrusion, Stenosis of lateral recess of L/S, spondylolisthesis L3-L4. Pt has pending referral to surgical spinal specialist. Pain worse with standing, walking, bending activities; better with sitting. FOTO score= 42/100 indicating 58% impairment to functional activities. Today's evaulation is significant for   1) POSTURE/OBSERVATION:  Standing posture with decreased LLE wb'ing, L>R genu valgum and tibial ER, hips flexed  FUNCTIONAL ASSESSMENT: ambulates with significantly decreased gait speed, decreased LLE wb'ing. 2) ROM flex WNL (requires UE support for balance); ext 0%;  Lat flex R WNL, L WNL; ROT R 25%, L 50%. 3) STRENGTH  Hip flex R 4+*/5, L 4*/5; ext R 3-/5, L 3-/5; abd R 4/5, L 4/5; Knee Ext R 5/5, L 5/5; flex R 5/5, L 5/5; Ankle DF R 5/5, L 4+/5. Poor core strength with bridge limited to ~ 1 inch clearance. Poor isometric TA. 4) SPECIAL TESTS: (-) SLR.    5) ADDITIONAL FINDINGS: ttp to b/l lower l/s paraspinals and SP L4-L5. Light touch intact BLE's. Poor tolerance for supine due to pain c/o. Pain with P/A L4-L5 glide. These findings are supportive for diagnosis. Pt will be a good candidate for skilled PT to address these impairments and promote return to normal ADLs and functional mobility for improved quality of life.    ===========================================================================================  Eval Complexity: History HIGH Complexity :3+ comorbidities / personal factors will impact the outcome/ POC ;  Examination  HIGH Complexity : 4+ Standardized tests and measures addressing body structure, function, activity limitation and / or participation in recreation ; Presentation MEDIUM Complexity : Evolving with changing characteristics ;   Decision Making MEDIUM Complexity : FOTO score of 26-74; Overall Complexity MEDIUM  Problem List: pain affecting function, decrease ROM, decrease strength, impaired gait/ balance, decrease ADL/ functional abilitiies, decrease activity tolerance, decrease flexibility/ joint mobility and decrease transfer abilities   Treatment Plan may include any combination of the following: Therapeutic exercise, Therapeutic activities, Neuromuscular re-education, Physical agent/modality, Gait/balance training, Manual therapy, Patient education, Self Care training, Functional mobility training, Home safety training and Stair training  Patient / Family readiness to learn indicated by: asking questions, trying to perform skills and interest  Persons(s) to be included in education: patient (P)  Barriers to Learning/Limitations: None  Measures taken, if barriers to learning:    Patient Goal (s): \"relieve pain, strengthen legs, no surgery\"   Patient self reported health status: good  Rehabilitation Potential: good   Short Term Goals: To be accomplished in  4  treatments:  1. Pt will be educated in appropriate HEP to decrease pain, increase ROM, increase strength and return pt to PLOF. 2. Pt will report at least 25% improvement in frequency/intensity of LE radicular symptoms with ADL's.   3. Pt will demonstrate Fair isometric TA for improved axial stability with standing ADL's.  Long Term Goals: To be accomplished in  8-12  treatments:  1. Pt will improve FOTO score to >/= 53 to demo a significant improvement in functional activity tolerance. 2. Patient will report at least 50% functional improvement with standing, walking ADL's.   3. Pt will demonstrate neutral spine with performing supine stability exercises for improved stability with daily tasks. 4. Pt will increase b/l glute med/max strength by 1/3 MMT for improved stability with walking activities. Frequency / Duration:   Patient to be seen  2  times per week for 8-12  treatments:  Patient / Caregiver education and instruction: self care, activity modification and exercises  Therapist Signature: Marita Eller, PT Date: 68/66/1123   Certification Period: 11/18/19 to 2/16/20 Time: 11:26 AM   ===========================================================================================  I certify that the above Physical Therapy Services are being furnished while the patient is under my care. I agree with the treatment plan and certify that this therapy is necessary. Physician Signature:        Date:       Time:     Please sign and return to [facility] or you may fax the signed copy to [phone]. Thank you.

## 2019-12-01 DIAGNOSIS — I10 ESSENTIAL HYPERTENSION: ICD-10-CM

## 2019-12-02 RX ORDER — HYDROCHLOROTHIAZIDE 50 MG/1
50 TABLET ORAL DAILY
Qty: 90 TABLET | Refills: 1 | Status: SHIPPED | OUTPATIENT
Start: 2019-12-02 | End: 2020-07-02 | Stop reason: ALTCHOICE

## 2019-12-03 ENCOUNTER — HOSPITAL ENCOUNTER (OUTPATIENT)
Dept: PHYSICAL THERAPY | Age: 64
Discharge: HOME | End: 2019-12-03
Payer: MEDICARE

## 2019-12-03 DIAGNOSIS — N39.0 RECURRENT UTI: ICD-10-CM

## 2019-12-03 PROCEDURE — 97110 THERAPEUTIC EXERCISES: CPT

## 2019-12-03 PROCEDURE — 97140 MANUAL THERAPY 1/> REGIONS: CPT

## 2019-12-03 NOTE — PROGRESS NOTES
PHYSICAL THERAPY - DAILY TREATMENT NOTE    Patient Name: Kaya Sharma        Date: 12/3/2019  : 1955   yes Patient  Verified  Visit #:   2     Insurance: Payor: Destiny Collins / Plan: [place] MED[place] PART A & B / Product Type: Medicare /      In time: 3:30 Out time: 4:27   Total Treatment Time: 57     Medicare/BCBS Time Tracking (below)   Total Timed Codes (min):  47 1:1 Treatment Time:  47     TREATMENT AREA =  Low back pain [M54.5]    SUBJECTIVE  Pain Level (on 0 to 10 scale):  2  / 10   Medication Changes/New allergies or changes in medical history, any new surgeries or procedures?    no  If yes, update Summary List   Subjective Functional Status/Changes:  []  No changes reported     \"Pain's not too bad at the moment b/c I haven't been walking, sitting too much. I took a muscle relaxer 2 hours before I came in\". Pt scheduled to see Dr. Kirt Jensen .        OBJECTIVE    Modalities Rationale:     decrease pain to improve patient's ability to tolerate standing, walking activities   min [] Estim, type/location:                                      []  att     []  unatt     []  w/US     []  w/ice    []  w/heat    min []  Mechanical Traction: type/lbs                   []  pro   []  sup   []  int   []  cont    []  before manual    []  after manual    min []  Ultrasound, settings/location:      min []  Iontophoresis w/ dexamethasone, location:                                               []  take home patch       []  in clinic   10 min [x]  Ice     []  Heat    location/position: To l/s in sitting    min []  Vasopneumatic Device, press/temp:     min []  Other:    [] Skin assessment post-treatment (if applicable):    []  intact    []  redness- no adverse reaction     []redness  adverse reaction:            37 min Therapeutic Exercise:  [x]  See flow sheet   Rationale:      increase ROM and increase strength to improve the patients ability to change/maintain body positions       10 min Manual Therapy:  STM to L>R l/s paraspinals. Manual b/l quad stretching   Rationale: decrease pain, increase ROM, increase tissue extensibility and decrease trigger points to tolerate standing/walking activities    Billed With/As:   [x] TE   [] TA   [] Neuro   [] Self Care Patient Education: [x] Review HEP    [] Progressed/Changed HEP based on:   [] positioning   [] body mechanics   [] transfers   [] heat/ice application    [] other:      Other Objective/Functional Measures:    -new exercises added as per flow sheet with vc's provided for form/technique 100% of the time.   -continues to require moderate cues for TA engagement in H/L.  -c/o LB cramp after transfers sit to supine and abdominal draw in. Post Treatment Pain Level (on 0 to 10) scale:   1  / 10     ASSESSMENT  Assessment/Changes in Function:     L>R l/s paraspinals hypertonicity; tolerates moderate pressure with STM. Pt reports no increase in pain after exercises. Ed on need for continued HEP practice to improve TA recruitment and ability to advance with exercises. []  See Progress Note/Recertification   Patient will continue to benefit from skilled PT services to modify and progress therapeutic interventions, address functional mobility deficits, address ROM deficits, address strength deficits, analyze and address soft tissue restrictions, analyze and cue movement patterns, analyze and modify body mechanics/ergonomics, assess and modify postural abnormalities and instruct in home and community integration to attain remaining goals. Progress toward goals / Updated goals: · Short Term Goals: To be accomplished in  4  treatments:  1. Pt will be educated in appropriate HEP to decrease pain, increase ROM, increase strength and return pt to PLOF.  -12/3: goal met; pt reports performing 2-3 x/wk  2. Pt will report at least 25% improvement in frequency/intensity of LE radicular symptoms with ADL's. -12/3: no change to LE radicular sx's.   3. Pt will demonstrate Fair isometric TA for improved axial stability with standing ADL's.-12/3: Poor+     · Long Term Goals: To be accomplished in  8-12  treatments:  1. Pt will improve FOTO score to >/= 53 to demo a significant improvement in functional activity tolerance. 2. Patient will report at least 50% functional improvement with standing, walking ADL's.   3. Pt will demonstrate neutral spine with performing supine stability exercises for improved stability with daily tasks. 4. Pt will increase b/l glute med/max strength by 1/3 MMT for improved stability with walking activities.          PLAN  []  Upgrade activities as tolerated yes Continue plan of care   []  Discharge due to :    []  Other:      Therapist: Joaquín Figueroa.  Lynn Singh, PT    Date: 12/3/2019 Time: 4:20 PM      Future Appointments   Date Time Provider Mina Ramos   12/3/2019  3:30 PM Raul Cables., PT [facility]   12/5/2019  3:00 PM Raul Cables., PT [facility]   12/10/2019  2:45 PM Raul Cables., PT [facility]   12/12/2019  2:15 PM Raul Cables., PT [facility]   12/17/2019  2:45 PM Raul Cables., PT [facility]   12/19/2019  2:15 PM Raul Cables., PT [facility]   12/31/2019  2:45 PM Raul Cables., PT [facility]

## 2019-12-04 RX ORDER — CEPHALEXIN 250 MG/1
CAPSULE ORAL
Qty: 30 CAPSULE | Refills: 0 | Status: SHIPPED | OUTPATIENT
Start: 2019-12-04 | End: 2020-01-30 | Stop reason: SDUPTHER

## 2019-12-05 ENCOUNTER — HOSPITAL ENCOUNTER (OUTPATIENT)
Dept: PHYSICAL THERAPY | Age: 64
Discharge: HOME | End: 2019-12-05
Payer: MEDICARE

## 2019-12-05 PROCEDURE — 97140 MANUAL THERAPY 1/> REGIONS: CPT

## 2019-12-05 PROCEDURE — 97110 THERAPEUTIC EXERCISES: CPT

## 2019-12-05 NOTE — PROGRESS NOTES
PHYSICAL THERAPY - DAILY TREATMENT NOTE    Patient Name: Seth Freed        Date: 2019  : 1955   yes Patient  Verified  Visit #:   2   of     Insurance: Payor: Rubin Bloch / Plan: [place] MEDICARE PART A & B / Product Type: Medicare /      In time: 3:00 Out time: 3:57   Total Treatment Time: 57     Medicare/BCBS Time Tracking (below)   Total Timed Codes (min):  47 1:1 Treatment Time:  47     TREATMENT AREA =  Low back pain [M54.5]    SUBJECTIVE  Pain Level (on 0 to 10 scale):  1-2  / 10   Medication Changes/New allergies or changes in medical history, any new surgeries or procedures?    no  If yes, update Summary List   Subjective Functional Status/Changes:  []  No changes reported     Pt reports a little sore yesterday, but no increase in pain after last session. \"I've been trying to practice my breathing exercise\". OBJECTIVE    Modalities Rationale:     decrease pain to improve patient's ability to tolerate standing, walking activities   min [] Estim, type/location:                                      []  att     []  unatt     []  w/US     []  w/ice    []  w/heat    min []  Mechanical Traction: type/lbs                   []  pro   []  sup   []  int   []  cont    []  before manual    []  after manual    min []  Ultrasound, settings/location:      min []  Iontophoresis w/ dexamethasone, location:                                               []  take home patch       []  in clinic   10 min [x]  Ice     []  Heat    location/position: To l/s in sitting    min []  Vasopneumatic Device, press/temp:     min []  Other:    [] Skin assessment post-treatment (if applicable):    []  intact    []  redness- no adverse reaction     []redness  adverse reaction:            37 min Therapeutic Exercise:  [x]  See flow sheet   Rationale:      increase ROM and increase strength to improve the patients ability to change/maintain body positions       10 min Manual Therapy:  STM to L>R l/s paraspinals. Manual b/l quad stretching   Rationale: decrease pain, increase ROM, increase tissue extensibility and decrease trigger points to tolerate standing/walking activities    Billed With/As:   [x] TE   [] TA   [] Neuro   [] Self Care Patient Education: [x] Review HEP    [] Progressed/Changed HEP based on:   [] positioning   [] body mechanics   [] transfers   [] heat/ice application    [] other:      Other Objective/Functional Measures:    -progressed to GTB h/l hip abd  -added BKFO; intermittent c/o L calf/hamstring cramping (stopped to stretch hamstrings after 1st attempt on LLE). -added s/l hip abd b/l; pt notes \"whole leg feels it\". -d/c lateral SB stretching due to pt report of no stretch felt. Post Treatment Pain Level (on 0 to 10) scale:   1-2  / 10     ASSESSMENT  Assessment/Changes in Function:     Pt able to tolerate slow progression of exercises; ed on DOMS and advised to continue with HEP to tolerance. []  See Progress Note/Recertification   Patient will continue to benefit from skilled PT services to modify and progress therapeutic interventions, address functional mobility deficits, address ROM deficits, address strength deficits, analyze and address soft tissue restrictions, analyze and cue movement patterns, analyze and modify body mechanics/ergonomics, assess and modify postural abnormalities and instruct in home and community integration to attain remaining goals. Progress toward goals / Updated goals: · Short Term Goals: To be accomplished in  4  treatments:  1. Pt will be educated in appropriate HEP to decrease pain, increase ROM, increase strength and return pt to PLOF.  -12/3: goal met; pt reports performing 2-3 x/wk  2. Pt will report at least 25% improvement in frequency/intensity of LE radicular symptoms with ADL's. -12/3: no change to LE radicular sx's.   3. Pt will demonstrate Fair isometric TA for improved axial stability with standing ADL's.-12/3: Poor+     · Long Term Goals: To be accomplished in  8-12  treatments:  1. Pt will improve FOTO score to >/= 53 to demo a significant improvement in functional activity tolerance. 2. Patient will report at least 50% functional improvement with standing, walking ADL's.   3. Pt will demonstrate neutral spine with performing supine stability exercises for improved stability with daily tasks. 4. Pt will increase b/l glute med/max strength by 1/3 MMT for improved stability with walking activities.          PLAN  []  Upgrade activities as tolerated yes Continue plan of care   []  Discharge due to :    []  Other:      Therapist: Sona Garcia.  Jomar Melton, TATIANNA    Date: 12/5/2019 Time: 3:59 PM      Future Appointments   Date Time Provider Mina Ramos   12/5/2019  3:00 PM Stephens Staunton., PT [facility]   12/10/2019  2:45 PM Stephens Staunton., PT [facility]   12/12/2019  2:15 PM Britany Staunton., PT [facility]   12/17/2019  2:45 PM Stephens Staunton., PT [facility]   12/19/2019  2:15 PM Britany Staunton., PT [facility]   12/31/2019  2:45 PM Britany Staunton., PT [facility]

## 2019-12-09 DIAGNOSIS — M48.061 STENOSIS OF LATERAL RECESS OF LUMBAR SPINE: ICD-10-CM

## 2019-12-09 DIAGNOSIS — Z98.890 HX OF LAMINECTOMY: ICD-10-CM

## 2019-12-09 DIAGNOSIS — M43.16 SPONDYLOLISTHESIS AT L3-L4 LEVEL: Primary | ICD-10-CM

## 2019-12-10 ENCOUNTER — HOSPITAL ENCOUNTER (OUTPATIENT)
Dept: PHYSICAL THERAPY | Age: 64
Discharge: HOME | End: 2019-12-10
Payer: MEDICARE

## 2019-12-10 PROCEDURE — 97140 MANUAL THERAPY 1/> REGIONS: CPT

## 2019-12-10 PROCEDURE — 97110 THERAPEUTIC EXERCISES: CPT

## 2019-12-10 NOTE — PROGRESS NOTES
PHYSICAL THERAPY - DAILY TREATMENT NOTE    Patient Name: Andreas Vogel        Date: 12/10/2019  : 1955   yes Patient  Verified  Visit #:   3   of     Insurance: Payor: Roland Haley / Plan: [place] MEDICARE PART A & B / Product Type: Medicare /      In time: 2:30 Out time: 3:55   Total Treatment Time: 85     Medicare/BCBS Time Tracking (below)   Total Timed Codes (min):  75 1:1 Treatment Time:  25     TREATMENT AREA =  Low back pain [M54.5]    SUBJECTIVE  Pain Level (on 0 to 10 scale):  3-4  / 10   Medication Changes/New allergies or changes in medical history, any new surgeries or procedures?    no  If yes, update Summary List   Subjective Functional Status/Changes:  []  No changes reported     C/o increased pain yesterday (states she had to sit in the car a lot) and today is a little better but \"my legs feel heavy\"; she notices that her pain is better with the mm relaxers       OBJECTIVE    Modalities Rationale:     decrease pain to improve patient's ability to tolerate standing, walking activities   min [] Estim, type/location:                                      []  att     []  unatt     []  w/US     []  w/ice    []  w/heat    min []  Mechanical Traction: type/lbs                   []  pro   []  sup   []  int   []  cont    []  before manual    []  after manual    min []  Ultrasound, settings/location:      min []  Iontophoresis w/ dexamethasone, location:                                               []  take home patch       []  in clinic   10 min []  Ice     [x]  Heat    location/position: To l/s in sitting    min []  Vasopneumatic Device, press/temp:     min []  Other:    [] Skin assessment post-treatment (if applicable):    []  intact    []  redness- no adverse reaction     []redness  adverse reaction:            10  (60) min Therapeutic Exercise:  [x]  See flow sheet   Rationale:      increase ROM and increase strength to improve the patients ability to change/maintain body positions 15 min Manual Therapy:  STM to R>L l/s paraspinals. Manual b/l quad stretching   Rationale: decrease pain, increase ROM, increase tissue extensibility and decrease trigger points to tolerate standing/walking activities    Billed With/As:   [x] TE   [] TA   [] Neuro   [] Self Care Patient Education: [x] Review HEP    [] Progressed/Changed HEP based on:   [] positioning   [] body mechanics   [] transfers   [] heat/ice application    [] other:      Other Objective/Functional Measures:    -increased reps with S/L abd; cues for form.  -unable to tolerate bridge due to pain     Post Treatment Pain Level (on 0 to 10) scale:   2  / 10     ASSESSMENT  Assessment/Changes in Function:     Pt with increased tone to R>L l/s paraspinals today, though pain c/o greater to Left. Progressing slowly with core strength/stability. Improved independence with isometric TA; fair today. Continue to progress as tolerated to achieve goals. []  See Progress Note/Recertification   Patient will continue to benefit from skilled PT services to modify and progress therapeutic interventions, address functional mobility deficits, address ROM deficits, address strength deficits, analyze and address soft tissue restrictions, analyze and cue movement patterns, analyze and modify body mechanics/ergonomics, assess and modify postural abnormalities and instruct in home and community integration to attain remaining goals. Progress toward goals / Updated goals: · Short Term Goals: To be accomplished in  4  treatments:  1. Pt will be educated in appropriate HEP to decrease pain, increase ROM, increase strength and return pt to PLOF.  -12/3: goal met; pt reports performing 2-3 x/wk  2. Pt will report at least 25% improvement in frequency/intensity of LE radicular symptoms with ADL's. -12/3: no change to LE radicular sx's.   3. Pt will demonstrate Fair isometric TA for improved axial stability with standing ADL's.-12/3: Poor+     · Long Term Goals: To be accomplished in  8-12  treatments:  1. Pt will improve FOTO score to >/= 53 to demo a significant improvement in functional activity tolerance. 2. Patient will report at least 50% functional improvement with standing, walking ADL's.   3. Pt will demonstrate neutral spine with performing supine stability exercises for improved stability with daily tasks. 4. Pt will increase b/l glute med/max strength by 1/3 MMT for improved stability with walking activities.          PLAN  []  Upgrade activities as tolerated yes Continue plan of care   []  Discharge due to :    []  Other:      Therapist: Keysha Garrison.  Leonora Donahue, PT    Date: 12/10/2019 Time: 4:58 PM      Future Appointments   Date Time Provider Mina Ramos   12/12/2019  2:15 PM Edavni Islas, PT [facility]   12/17/2019  2:45 PM Wendy Simmons., PT [facility]   12/19/2019  2:15 PM Wendy Simmons., PT [facility]   12/31/2019  2:45 PM Wendy Islas, PT [facility]

## 2019-12-12 ENCOUNTER — HOSPITAL ENCOUNTER (OUTPATIENT)
Dept: PHYSICAL THERAPY | Age: 64
Discharge: HOME | End: 2019-12-12
Payer: MEDICARE

## 2019-12-12 PROCEDURE — 97110 THERAPEUTIC EXERCISES: CPT

## 2019-12-12 PROCEDURE — 97140 MANUAL THERAPY 1/> REGIONS: CPT

## 2019-12-12 NOTE — PROGRESS NOTES
PHYSICAL THERAPY - DAILY TREATMENT NOTE    Patient Name: Kaya Sharma        Date: 2019  : 1955   yes Patient  Verified  Visit #:   4     Insurance: Payor: Destiny Collins / Plan: [place] MEDICARE PART A & B / Product Type: Medicare /      In time: 2:18 PM Out time: 3:15   Total Treatment Time: 57     Medicare/BCBS Time Tracking (below)   Total Timed Codes (min):  47 1:1 Treatment Time:  47     TREATMENT AREA =  Low back pain [M54.5]    SUBJECTIVE  Pain Level (on 0 to 10 scale):  1-2  / 10   Medication Changes/New allergies or changes in medical history, any new surgeries or procedures?    no  If yes, update Summary List   Subjective Functional Status/Changes:  []  No changes reported     Pt states she is feeling pretty good today and did not take her mm relaxers. Reports compliance with HEP. OBJECTIVE    Modalities Rationale:     decrease pain to improve patient's ability to tolerate standing, walking activities   min [] Estim, type/location:                                      []  att     []  unatt     []  w/US     []  w/ice    []  w/heat    min []  Mechanical Traction: type/lbs                   []  pro   []  sup   []  int   []  cont    []  before manual    []  after manual    min []  Ultrasound, settings/location:      min []  Iontophoresis w/ dexamethasone, location:                                               []  take home patch       []  in clinic   10 min []  Ice     [x]  Heat    location/position: To l/s in sitting    min []  Vasopneumatic Device, press/temp:     min []  Other:    [] Skin assessment post-treatment (if applicable):    []  intact    []  redness- no adverse reaction     []redness  adverse reaction:            37 min Therapeutic Exercise:  [x]  See flow sheet   Rationale:      increase ROM and increase strength to improve the patients ability to change/maintain body positions       10 min Manual Therapy:  STM to R>L l/s paraspinals.   Manual b/l quad stretching   Rationale: decrease pain, increase ROM, increase tissue extensibility and decrease trigger points to tolerate standing/walking activities    Billed With/As:   [x] TE   [] TA   [] Neuro   [] Self Care Patient Education: [x] Review HEP    [] Progressed/Changed HEP based on:   [] positioning   [] body mechanics   [] transfers   [] heat/ice application    [] other:      Other Objective/Functional Measures:    -progressed iso TA to RSB  -able to perform PPT through full ROM; no pain c/o     Post Treatment Pain Level (on 0 to 10) scale:   1-2  / 10     ASSESSMENT  Assessment/Changes in Function:   Pt demonstrates improving b/l hip IR and quad flexibility. Pt increasing tolerance for core exercises with PPT and TA exercises. Continues to have fatigue c/o after exercises indicating appropriate intensity. []  See Progress Note/Recertification   Patient will continue to benefit from skilled PT services to modify and progress therapeutic interventions, address functional mobility deficits, address ROM deficits, address strength deficits, analyze and address soft tissue restrictions, analyze and cue movement patterns, analyze and modify body mechanics/ergonomics, assess and modify postural abnormalities and instruct in home and community integration to attain remaining goals. Progress toward goals / Updated goals: · Short Term Goals: To be accomplished in  4  treatments:  1. Pt will be educated in appropriate HEP to decrease pain, increase ROM, increase strength and return pt to PLOF.  -12/3: goal met; pt reports performing 2-3 x/wk  2. Pt will report at least 25% improvement in frequency/intensity of LE radicular symptoms with ADL's. -12/3: no change to LE radicular sx's. 3. Pt will demonstrate Fair isometric TA for improved axial stability with standing ADL's.-12/12: goal met; Fair     · Long Term Goals: To be accomplished in  8-12  treatments:  1.  Pt will improve FOTO score to >/= 53 to demo a significant improvement in functional activity tolerance. 2. Patient will report at least 50% functional improvement with standing, walking ADL's.   3. Pt will demonstrate neutral spine with performing supine stability exercises for improved stability with daily tasks. 4. Pt will increase b/l glute med/max strength by 1/3 MMT for improved stability with walking activities.          PLAN  []  Upgrade activities as tolerated yes Continue plan of care   []  Discharge due to :    []  Other:      Therapist: Shoaib Caicedo, PT    Date: 12/12/2019 Time: 3:17 PM      Future Appointments   Date Time Provider Mina Ramos   12/12/2019  2:15 PM Izabella Crater., PT [facility]   12/17/2019  2:45 PM Izbaella Crater., PT [facility]   12/19/2019  2:15 PM Izabella Crater., PT [facility]   12/31/2019  2:45 PM Izabella Crater., PT [facility]

## 2019-12-17 ENCOUNTER — HOSPITAL ENCOUNTER (OUTPATIENT)
Dept: PHYSICAL THERAPY | Age: 64
Discharge: HOME | End: 2019-12-17
Payer: MEDICARE

## 2019-12-17 PROCEDURE — 97110 THERAPEUTIC EXERCISES: CPT

## 2019-12-17 PROCEDURE — 97140 MANUAL THERAPY 1/> REGIONS: CPT

## 2019-12-17 NOTE — PROGRESS NOTES
PHYSICAL THERAPY - DAILY TREATMENT NOTE    Patient Name: Tan Sena        Date: 2019  : 1955   yes Patient  Verified  Visit #:     Insurance: Payor: Jaimie King / Plan: [place] MEDICARE PART A & B / Product Type: Medicare /      In time: 2:45 PM Out time: 3:51   Total Treatment Time: 66     Medicare/BCBS Time Tracking (below)   Total Timed Codes (min):  56 1:1 Treatment Time:  56     TREATMENT AREA =  Low back pain [M54.5]    SUBJECTIVE  Pain Level (on 0 to 10 scale):  2  / 10   Medication Changes/New allergies or changes in medical history, any new surgeries or procedures?    no  If yes, update Summary List   Subjective Functional Status/Changes:  []  No changes reported     See d/c       OBJECTIVE    Modalities Rationale:     decrease pain to improve patient's ability to tolerate standing, walking activities   min [] Estim, type/location:                                      []  att     []  unatt     []  w/US     []  w/ice    []  w/heat    min []  Mechanical Traction: type/lbs                   []  pro   []  sup   []  int   []  cont    []  before manual    []  after manual    min []  Ultrasound, settings/location:      min []  Iontophoresis w/ dexamethasone, location:                                               []  take home patch       []  in clinic   10 min []  Ice     [x]  Heat    location/position: To l/s in sitting    min []  Vasopneumatic Device, press/temp:     min []  Other:    [] Skin assessment post-treatment (if applicable):    []  intact    []  redness- no adverse reaction     []redness  adverse reaction:            38 min Therapeutic Exercise:  [x]  See flow sheet   Rationale:      increase ROM and increase strength to improve the patients ability to change/maintain body positions       18 min Manual Therapy:  STM to R>L l/s paraspinals.   Manual b/l quad stretching   Rationale: decrease pain, increase ROM, increase tissue extensibility and decrease trigger points to tolerate standing/walking activities    Billed With/As:   [x] TE   [] TA   [] Neuro   [] Self Care Patient Education: [x] Review HEP    [] Progressed/Changed HEP based on:   [] positioning   [] body mechanics   [] transfers   [] heat/ice application    [] other:      Other Objective/Functional Measures:    See d/c  Reviewed finalized HEP     Post Treatment Pain Level (on 0 to 10) scale:   2  / 10     ASSESSMENT  Assessment/Changes in Function:   See d/c           Progress toward goals / Updated goals:  See d/c       PLAN  []  Upgrade activities as tolerated yes Continue plan of care   []  Discharge due to :    []  Other:      Therapist: Max Bhatt.  Nan Feldman, TATIANNA    Date: 12/17/2019 Time: 3:59 PM      Future Appointments   Date Time Provider Mina Ramos   12/17/2019  2:45 PM [facility] [address]   12/19/2019  2:15 PM [facility] [address]   12/31/2019  2:45 PM [facility] [address]

## 2019-12-17 NOTE — PROGRESS NOTES
[address] [facility] PHYSICAL THERAPY  Lauren [address], [place], [address] - Phone: [phone]  Fax: [phone] FOR PHYSICAL THERAPY        Patient Name: Rebecca Jhaveri : 1955   Treatment/Medical Diagnosis: Low back pain [M54.5]   Onset Date:     Referral Source: Romulo Polanco MD Start of [facility]): 19   Prior Hospitalization: See Medical History Provider #: [id number]   Prior Level of Function: Limited tolerance for walking, stairs, standing due to L knee pain. Comorbidities: Lumbar laminectomy , arthritis (OA), HTN, L TKA 2018, R TKA 2012, BMI>30   Medications: Verified on Patient Summary List   Visits from [facility]: 6 Missed Visits: 0     Goal/Measure of Progress Goal Met? 1. Pt will be educated in appropriate HEP to decrease pain, increase ROM, increase strength and return pt to PLOF.     Status at last Eval:  Current Status: yes yes   2. Pt will report at least 25% improvement in frequency/intensity of LE radicular symptoms with ADL's. Status at last Eval:  Current Status: 25%; pt attributes to Gabapentin yes   3. Pt will demonstrate Fair isometric TA for improved axial stability with standing ADL's. Status at last Eval: Poor Current Status: Good yes     Goal/Measure of Progress Goal Met? 4. Pt will improve FOTO score to >/= 53 to demo a significant improvement in functional activity tolerance. Status at last Eval: 42 Current Status: 52 progressing   5. Patient will report at least 50% functional improvement with standing, walking ADL's.    Status at last Eval:  Current Status: 0% no   6.   Pt will increase b/l glute med/max strength by 1/3 MMT for improved stability with walking activities   Status at last Eval: ext R 3-/5, L 3-/5; abd R 4/5, L 4/5 Current Status: Ext poor; bridge ~ 1 inch clearance p!; abd 4+/5, L 4-/5 no     Key Functional Changes/Progress:  Pt rates overall improvement as 25% with functional activities since [facility]. Pt notes decreased pain levels, however attributes this to taking Gabapentin and is unsure if therapy has reduced her pain levels. Pt states she has recently seen spinal surgeon with pending xray testing to determine surgical procedure/recommendations. Pt has been educated on long term HEP and is in agreement to d/c further skilled PT at this time. Current improvements: Pt reports improved ease with lower body dressing (I.e. don sock),   Current objective impairments:  Max pain 4-5/10, least pain 0/10, average pain 1-2 (with Gabapentin)  AROM: lumbar flex WNL, ext 10%, SB 50% b/l, ROT R 10%, L 25%. Strength: Hip flex R 4+/5, L 3+*/5, abd R 4+/5, L 4-/5; ext Poor as per Bridge with 1 inch hip clearance and pain c/o  GROC +1 indicating minimal subjective improvement in sx's. Current functional limitations:  Prolonged standing/ sitting/ walking, bending. Assessments/Recommendations: Discontinue therapy. Progressing towards or have reached established goals. If you have any questions/comments please contact us directly at [phone]. Thank you for allowing us to assist in the care of your patient. Therapist Signature: Mo Escobar.  TATIANNA Eller Date: 12/17/19    Reporting Period: 11/18/19 to 12/17/19  Time: 3:44 PM

## 2019-12-19 ENCOUNTER — APPOINTMENT (OUTPATIENT)
Dept: PHYSICAL THERAPY | Age: 64
End: 2019-12-19
Payer: MEDICARE

## 2019-12-22 DIAGNOSIS — I10 BENIGN ESSENTIAL HTN: ICD-10-CM

## 2019-12-23 RX ORDER — METOPROLOL SUCCINATE 25 MG/1
25 TABLET, EXTENDED RELEASE ORAL DAILY
Qty: 90 TABLET | Refills: 1 | Status: SHIPPED | OUTPATIENT
Start: 2019-12-23 | End: 2020-06-08

## 2019-12-29 DIAGNOSIS — I10 BENIGN ESSENTIAL HTN: ICD-10-CM

## 2019-12-30 RX ORDER — LISINOPRIL 5 MG/1
5 TABLET ORAL DAILY
Qty: 90 TABLET | Refills: 1 | Status: SHIPPED | OUTPATIENT
Start: 2019-12-30 | End: 2020-06-16

## 2019-12-31 ENCOUNTER — APPOINTMENT (OUTPATIENT)
Dept: PHYSICAL THERAPY | Age: 64
End: 2019-12-31
Payer: MEDICARE

## 2020-01-01 ENCOUNTER — EXTERNAL RECORD (OUTPATIENT)
Dept: OTHER | Age: 65
End: 2020-01-01

## 2020-01-07 LAB
T3FREE SERPL-MCNC: 3.1 PG/ML (ref 2–4.4)
T4 FREE SERPL-MCNC: 1.27 NG/DL (ref 0.82–1.77)
TSH SERPL-ACNC: 2.9 UIU/ML (ref 0.45–4.5)
URATE SERPL-MCNC: 7 MG/DL (ref 2.5–7.1)

## 2020-01-30 DIAGNOSIS — N39.0 RECURRENT UTI: ICD-10-CM

## 2020-01-30 LAB
BUN SERPL-MCNC: 9 MG/DL (ref 8–27)
BUN/CREAT SERPL: 10 (ref 12–28)
CALCIUM SERPL-MCNC: 10 MG/DL (ref 8.7–10.3)
CHLORIDE SERPL-SCNC: 101 MMOL/L (ref 96–106)
CO2 SERPL-SCNC: 26 MMOL/L (ref 20–29)
CREAT SERPL-MCNC: 0.89 MG/DL (ref 0.57–1)
GLUCOSE SERPL-MCNC: 100 MG/DL (ref 65–99)
LENGTH OF FAST TIME PATIENT: ABNORMAL H
POTASSIUM SERPL-SCNC: 4.7 MMOL/L (ref 3.5–5.2)
SODIUM SERPL-SCNC: 140 MMOL/L (ref 134–144)

## 2020-01-30 RX ORDER — CEPHALEXIN 250 MG/1
CAPSULE ORAL
Qty: 30 CAPSULE | Refills: 1 | Status: SHIPPED | OUTPATIENT
Start: 2020-01-30 | End: 2020-09-22 | Stop reason: SDUPTHER

## 2020-02-14 PROBLEM — M48.061 LUMBAR STENOSIS: Status: ACTIVE | Noted: 2020-02-14

## 2020-03-10 DIAGNOSIS — E78.2 MIXED HYPERLIPIDEMIA: ICD-10-CM

## 2020-03-10 RX ORDER — SIMVASTATIN 40 MG
40 TABLET ORAL NIGHTLY
Qty: 90 TABLET | Refills: 0 | Status: SHIPPED | OUTPATIENT
Start: 2020-03-10 | End: 2020-06-08

## 2020-03-13 DIAGNOSIS — M48.07 STENOSIS OF LATERAL RECESS OF LUMBOSACRAL SPINE: ICD-10-CM

## 2020-03-13 DIAGNOSIS — M12.9 ARTHRITIS INVOLVING MULTIPLE SITES: ICD-10-CM

## 2020-03-16 RX ORDER — CELECOXIB 200 MG/1
200 CAPSULE ORAL DAILY
Qty: 90 CAPSULE | Refills: 0 | Status: SHIPPED | OUTPATIENT
Start: 2020-03-16 | End: 2020-06-08

## 2020-06-06 DIAGNOSIS — M12.9 ARTHRITIS INVOLVING MULTIPLE SITES: ICD-10-CM

## 2020-06-06 DIAGNOSIS — E78.2 MIXED HYPERLIPIDEMIA: ICD-10-CM

## 2020-06-06 DIAGNOSIS — I10 BENIGN ESSENTIAL HTN: ICD-10-CM

## 2020-06-06 DIAGNOSIS — M48.07 STENOSIS OF LATERAL RECESS OF LUMBOSACRAL SPINE: ICD-10-CM

## 2020-06-08 DIAGNOSIS — M12.9 ARTHRITIS INVOLVING MULTIPLE SITES: ICD-10-CM

## 2020-06-08 DIAGNOSIS — E78.2 MIXED HYPERLIPIDEMIA: ICD-10-CM

## 2020-06-08 DIAGNOSIS — M48.07 STENOSIS OF LATERAL RECESS OF LUMBOSACRAL SPINE: ICD-10-CM

## 2020-06-08 DIAGNOSIS — I10 BENIGN ESSENTIAL HTN: ICD-10-CM

## 2020-06-08 RX ORDER — CELECOXIB 200 MG/1
200 CAPSULE ORAL DAILY
Qty: 90 CAPSULE | OUTPATIENT
Start: 2020-06-08

## 2020-06-08 RX ORDER — SIMVASTATIN 40 MG
TABLET ORAL
Qty: 90 TABLET | OUTPATIENT
Start: 2020-06-08

## 2020-06-08 RX ORDER — METOPROLOL SUCCINATE 25 MG/1
25 TABLET, EXTENDED RELEASE ORAL DAILY
Qty: 90 TABLET | OUTPATIENT
Start: 2020-06-08

## 2020-06-08 RX ORDER — CELECOXIB 200 MG/1
200 CAPSULE ORAL DAILY
Qty: 30 CAPSULE | Refills: 0 | Status: SHIPPED | OUTPATIENT
Start: 2020-06-08 | End: 2020-11-09 | Stop reason: SDUPTHER

## 2020-06-08 RX ORDER — METOPROLOL SUCCINATE 25 MG/1
25 TABLET, EXTENDED RELEASE ORAL DAILY
Qty: 30 TABLET | Refills: 0 | Status: SHIPPED | OUTPATIENT
Start: 2020-06-08 | End: 2020-07-02 | Stop reason: SDUPTHER

## 2020-06-08 RX ORDER — SIMVASTATIN 40 MG
40 TABLET ORAL NIGHTLY
Qty: 90 TABLET | Refills: 0 | Status: CANCELLED | OUTPATIENT
Start: 2020-06-08

## 2020-06-08 RX ORDER — SIMVASTATIN 40 MG
40 TABLET ORAL NIGHTLY
Qty: 30 TABLET | Refills: 0 | Status: SHIPPED | OUTPATIENT
Start: 2020-06-08 | End: 2020-07-02 | Stop reason: SDUPTHER

## 2020-06-16 DIAGNOSIS — I10 BENIGN ESSENTIAL HTN: ICD-10-CM

## 2020-06-16 RX ORDER — LISINOPRIL 5 MG/1
5 TABLET ORAL DAILY
Qty: 90 TABLET | OUTPATIENT
Start: 2020-06-16

## 2020-06-16 RX ORDER — LISINOPRIL 5 MG/1
5 TABLET ORAL DAILY
Qty: 30 TABLET | Refills: 0 | Status: SHIPPED | OUTPATIENT
Start: 2020-06-16 | End: 2020-07-02 | Stop reason: SDUPTHER

## 2020-07-01 ASSESSMENT — PATIENT HEALTH QUESTIONNAIRE - PHQ9
CLINICAL INTERPRETATION OF PHQ2 SCORE: NO FURTHER SCREENING NEEDED
CLINICAL INTERPRETATION OF PHQ2 SCORE: NO FURTHER SCREENING NEEDED
2. FEELING DOWN, DEPRESSED OR HOPELESS: NOT AT ALL
CLINICAL INTERPRETATION OF PHQ2 SCORE: 0
1. LITTLE INTEREST OR PLEASURE IN DOING THINGS: NOT AT ALL

## 2020-07-02 ENCOUNTER — OFFICE VISIT (OUTPATIENT)
Dept: FAMILY MEDICINE | Age: 65
End: 2020-07-02

## 2020-07-02 ENCOUNTER — LAB SERVICES (OUTPATIENT)
Dept: LAB | Age: 65
End: 2020-07-02

## 2020-07-02 ENCOUNTER — APPOINTMENT (OUTPATIENT)
Dept: FAMILY MEDICINE | Age: 65
End: 2020-07-02

## 2020-07-02 DIAGNOSIS — E78.2 MIXED HYPERLIPIDEMIA: ICD-10-CM

## 2020-07-02 DIAGNOSIS — Z12.31 VISIT FOR SCREENING MAMMOGRAM: ICD-10-CM

## 2020-07-02 DIAGNOSIS — Z00.00 MEDICARE ANNUAL WELLNESS VISIT, INITIAL: Primary | ICD-10-CM

## 2020-07-02 DIAGNOSIS — K58.0 IRRITABLE BOWEL SYNDROME WITH DIARRHEA: ICD-10-CM

## 2020-07-02 DIAGNOSIS — I10 BENIGN ESSENTIAL HTN: ICD-10-CM

## 2020-07-02 LAB
CHOLEST SERPL-MCNC: 218 MG/DL
CHOLEST/HDLC SERPL: 3.8 {RATIO}
FASTING DURATION TIME PATIENT: 12 HOURS
HDLC SERPL-MCNC: 58 MG/DL
LDLC SERPL CALC-MCNC: 109 MG/DL
NONHDLC SERPL-MCNC: 160 MG/DL
TRIGL SERPL-MCNC: 253 MG/DL

## 2020-07-02 PROCEDURE — 99214 OFFICE O/P EST MOD 30 MIN: CPT | Performed by: FAMILY MEDICINE

## 2020-07-02 PROCEDURE — 80061 LIPID PANEL: CPT | Performed by: CLINICAL MEDICAL LABORATORY

## 2020-07-02 PROCEDURE — 36415 COLL VENOUS BLD VENIPUNCTURE: CPT | Performed by: INTERNAL MEDICINE

## 2020-07-02 RX ORDER — FUROSEMIDE 20 MG/1
20 TABLET ORAL DAILY
COMMUNITY
End: 2020-07-02 | Stop reason: SDUPTHER

## 2020-07-02 RX ORDER — LISINOPRIL 5 MG/1
5 TABLET ORAL DAILY
Qty: 90 TABLET | Refills: 1 | Status: SHIPPED | OUTPATIENT
Start: 2020-07-02 | End: 2020-07-06 | Stop reason: SDUPTHER

## 2020-07-02 RX ORDER — METOPROLOL SUCCINATE 25 MG/1
25 TABLET, EXTENDED RELEASE ORAL DAILY
Qty: 90 TABLET | Refills: 1 | Status: SHIPPED | OUTPATIENT
Start: 2020-07-02 | End: 2020-12-09 | Stop reason: SDUPTHER

## 2020-07-02 RX ORDER — SIMVASTATIN 40 MG
40 TABLET ORAL NIGHTLY
Qty: 90 TABLET | Refills: 1 | Status: SHIPPED | OUTPATIENT
Start: 2020-07-02 | End: 2020-12-09

## 2020-07-02 RX ORDER — FUROSEMIDE 20 MG/1
20 TABLET ORAL DAILY
Qty: 90 TABLET | Refills: 1 | Status: SHIPPED | OUTPATIENT
Start: 2020-07-02 | End: 2020-12-09 | Stop reason: SDUPTHER

## 2020-07-02 RX ORDER — OMEPRAZOLE 20 MG/1
20 CAPSULE, DELAYED RELEASE ORAL DAILY
COMMUNITY
End: 2021-09-01 | Stop reason: ALTCHOICE

## 2020-07-02 ASSESSMENT — ENCOUNTER SYMPTOMS
CONSTIPATION: 0
NAUSEA: 0
BLOOD IN STOOL: 0
HEADACHES: 0
COUGH: 0
WEAKNESS: 0
LIGHT-HEADEDNESS: 0
WOUND: 0
CHILLS: 0
FEVER: 0
ABDOMINAL PAIN: 0
NUMBNESS: 0
DIZZINESS: 0
FATIGUE: 0
SHORTNESS OF BREATH: 0
DIARRHEA: 1
VOMITING: 0
SORE THROAT: 0
RHINORRHEA: 0

## 2020-07-02 ASSESSMENT — MONTREAL COGNITIVE ASSESSMENT (MOCA): WHAT IS THE TOTAL SCORE (OUT OF 30): 29

## 2020-07-02 ASSESSMENT — PATIENT HEALTH QUESTIONNAIRE - PHQ9
CLINICAL INTERPRETATION OF PHQ2 SCORE: 0
1. LITTLE INTEREST OR PLEASURE IN DOING THINGS: NOT AT ALL
2. FEELING DOWN, DEPRESSED OR HOPELESS: NOT AT ALL

## 2020-07-02 ASSESSMENT — VISUAL ACUITY
OD_CC: 20/20
OS_CC: 20/20

## 2020-07-03 ENCOUNTER — TELEPHONE (OUTPATIENT)
Dept: FAMILY MEDICINE | Age: 65
End: 2020-07-03

## 2020-07-06 ENCOUNTER — E-ADVICE (OUTPATIENT)
Dept: FAMILY MEDICINE | Age: 65
End: 2020-07-06

## 2020-07-06 RX ORDER — LISINOPRIL 10 MG/1
5 TABLET ORAL DAILY
Qty: 45 TABLET | Refills: 3 | Status: SHIPPED | OUTPATIENT
Start: 2020-07-06 | End: 2021-01-25 | Stop reason: SDUPTHER

## 2020-07-20 ENCOUNTER — APPOINTMENT (OUTPATIENT)
Dept: MAMMOGRAPHY | Age: 65
End: 2020-07-20
Attending: FAMILY MEDICINE

## 2020-07-20 VITALS
WEIGHT: 269 LBS | SYSTOLIC BLOOD PRESSURE: 130 MMHG | RESPIRATION RATE: 14 BRPM | HEIGHT: 72 IN | DIASTOLIC BLOOD PRESSURE: 82 MMHG | BODY MASS INDEX: 36.44 KG/M2 | TEMPERATURE: 98.2 F | HEART RATE: 82 BPM

## 2020-07-27 ENCOUNTER — TELEPHONE (OUTPATIENT)
Dept: SCHEDULING | Age: 65
End: 2020-07-27

## 2020-09-22 DIAGNOSIS — N39.0 RECURRENT UTI: ICD-10-CM

## 2020-09-22 RX ORDER — CEPHALEXIN 250 MG/1
CAPSULE ORAL
Qty: 30 CAPSULE | Refills: 1 | Status: SHIPPED | OUTPATIENT
Start: 2020-09-22 | End: 2021-10-13

## 2020-11-09 DIAGNOSIS — M48.07 STENOSIS OF LATERAL RECESS OF LUMBOSACRAL SPINE: ICD-10-CM

## 2020-11-09 DIAGNOSIS — M12.9 ARTHRITIS INVOLVING MULTIPLE SITES: ICD-10-CM

## 2020-11-09 RX ORDER — CELECOXIB 200 MG/1
200 CAPSULE ORAL DAILY
Qty: 90 CAPSULE | Refills: 1 | Status: SHIPPED | OUTPATIENT
Start: 2020-11-09 | End: 2021-04-06

## 2020-12-09 DIAGNOSIS — I10 BENIGN ESSENTIAL HTN: ICD-10-CM

## 2020-12-09 DIAGNOSIS — E78.2 MIXED HYPERLIPIDEMIA: ICD-10-CM

## 2020-12-09 RX ORDER — METOPROLOL SUCCINATE 25 MG/1
25 TABLET, EXTENDED RELEASE ORAL DAILY
Qty: 90 TABLET | Refills: 0 | Status: SHIPPED | OUTPATIENT
Start: 2020-12-09 | End: 2021-04-07

## 2020-12-09 RX ORDER — FUROSEMIDE 20 MG/1
20 TABLET ORAL DAILY
Qty: 90 TABLET | Refills: 0 | Status: SHIPPED | OUTPATIENT
Start: 2020-12-09 | End: 2021-06-29

## 2020-12-09 RX ORDER — SIMVASTATIN 40 MG
40 TABLET ORAL NIGHTLY
Qty: 90 TABLET | Refills: 1 | Status: SHIPPED | OUTPATIENT
Start: 2020-12-09 | End: 2021-06-29

## 2021-01-08 DIAGNOSIS — I10 BENIGN ESSENTIAL HTN: ICD-10-CM

## 2021-01-12 RX ORDER — LISINOPRIL 5 MG/1
5 TABLET ORAL DAILY
Qty: 90 TABLET | Refills: 1 | OUTPATIENT
Start: 2021-01-12

## 2021-01-25 ENCOUNTER — TELEPHONE (OUTPATIENT)
Dept: FAMILY MEDICINE | Age: 66
End: 2021-01-25

## 2021-01-25 RX ORDER — LISINOPRIL 10 MG/1
5 TABLET ORAL DAILY
Qty: 45 TABLET | Refills: 1 | Status: SHIPPED | OUTPATIENT
Start: 2021-01-25 | End: 2021-01-27 | Stop reason: SDUPTHER

## 2021-01-27 RX ORDER — LISINOPRIL 5 MG/1
5 TABLET ORAL DAILY
Qty: 90 TABLET | Refills: 1 | Status: SHIPPED | OUTPATIENT
Start: 2021-01-27 | End: 2021-08-02

## 2021-03-08 DIAGNOSIS — Z23 NEED FOR VACCINATION: ICD-10-CM

## 2021-03-22 RX ORDER — DICYCLOMINE HCL 20 MG
20 TABLET ORAL
Qty: 120 TABLET | Refills: 1 | Status: SHIPPED | OUTPATIENT
Start: 2021-03-22 | End: 2021-09-27

## 2021-04-04 DIAGNOSIS — M48.07 STENOSIS OF LATERAL RECESS OF LUMBOSACRAL SPINE: ICD-10-CM

## 2021-04-04 DIAGNOSIS — M12.9 ARTHRITIS INVOLVING MULTIPLE SITES: ICD-10-CM

## 2021-04-06 DIAGNOSIS — I10 BENIGN ESSENTIAL HTN: ICD-10-CM

## 2021-04-06 RX ORDER — CELECOXIB 200 MG/1
200 CAPSULE ORAL DAILY
Qty: 90 CAPSULE | Refills: 0 | Status: SHIPPED | OUTPATIENT
Start: 2021-04-06 | End: 2021-06-29

## 2021-04-07 RX ORDER — METOPROLOL SUCCINATE 25 MG/1
25 TABLET, EXTENDED RELEASE ORAL DAILY
Qty: 90 TABLET | Refills: 0 | Status: SHIPPED | OUTPATIENT
Start: 2021-04-07 | End: 2021-06-29

## 2021-04-09 ENCOUNTER — TELEPHONE (OUTPATIENT)
Dept: FAMILY MEDICINE | Age: 66
End: 2021-04-09

## 2021-06-20 DIAGNOSIS — I10 BENIGN ESSENTIAL HTN: ICD-10-CM

## 2021-06-20 DIAGNOSIS — M48.07 STENOSIS OF LATERAL RECESS OF LUMBOSACRAL SPINE: ICD-10-CM

## 2021-06-20 DIAGNOSIS — M12.9 ARTHRITIS INVOLVING MULTIPLE SITES: ICD-10-CM

## 2021-06-20 DIAGNOSIS — E78.2 MIXED HYPERLIPIDEMIA: ICD-10-CM

## 2021-06-29 DIAGNOSIS — E78.2 MIXED HYPERLIPIDEMIA: ICD-10-CM

## 2021-06-29 DIAGNOSIS — M12.9 ARTHRITIS INVOLVING MULTIPLE SITES: ICD-10-CM

## 2021-06-29 DIAGNOSIS — I10 BENIGN ESSENTIAL HTN: ICD-10-CM

## 2021-06-29 DIAGNOSIS — M48.07 STENOSIS OF LATERAL RECESS OF LUMBOSACRAL SPINE: ICD-10-CM

## 2021-06-29 RX ORDER — SIMVASTATIN 40 MG
TABLET ORAL
Qty: 90 TABLET | OUTPATIENT
Start: 2021-06-29

## 2021-06-29 RX ORDER — CELECOXIB 200 MG/1
200 CAPSULE ORAL DAILY
Qty: 90 CAPSULE | OUTPATIENT
Start: 2021-06-29

## 2021-06-29 RX ORDER — SIMVASTATIN 40 MG
40 TABLET ORAL NIGHTLY
Qty: 30 TABLET | Refills: 0 | Status: SHIPPED | OUTPATIENT
Start: 2021-06-29 | End: 2021-07-28

## 2021-06-29 RX ORDER — METOPROLOL SUCCINATE 25 MG/1
25 TABLET, EXTENDED RELEASE ORAL DAILY
Qty: 30 TABLET | Refills: 0 | Status: SHIPPED | OUTPATIENT
Start: 2021-06-29 | End: 2021-07-28

## 2021-06-29 RX ORDER — FUROSEMIDE 20 MG/1
20 TABLET ORAL DAILY
Qty: 30 TABLET | Refills: 0 | Status: SHIPPED | OUTPATIENT
Start: 2021-06-29 | End: 2021-07-28

## 2021-06-29 RX ORDER — CELECOXIB 200 MG/1
200 CAPSULE ORAL DAILY
Qty: 30 CAPSULE | Refills: 0 | Status: SHIPPED | OUTPATIENT
Start: 2021-06-29 | End: 2021-08-30

## 2021-06-29 RX ORDER — METOPROLOL SUCCINATE 25 MG/1
25 TABLET, EXTENDED RELEASE ORAL DAILY
Qty: 90 TABLET | OUTPATIENT
Start: 2021-06-29

## 2021-06-29 RX ORDER — FUROSEMIDE 20 MG/1
20 TABLET ORAL DAILY
Qty: 90 TABLET | OUTPATIENT
Start: 2021-06-29

## 2021-07-27 DIAGNOSIS — I10 BENIGN ESSENTIAL HTN: ICD-10-CM

## 2021-07-27 DIAGNOSIS — E78.2 MIXED HYPERLIPIDEMIA: ICD-10-CM

## 2021-07-28 RX ORDER — SIMVASTATIN 40 MG
40 TABLET ORAL NIGHTLY
Qty: 14 TABLET | Refills: 0 | Status: SHIPPED | OUTPATIENT
Start: 2021-07-28 | End: 2021-08-30

## 2021-07-28 RX ORDER — METOPROLOL SUCCINATE 25 MG/1
25 TABLET, EXTENDED RELEASE ORAL DAILY
Qty: 14 TABLET | Refills: 0 | Status: SHIPPED | OUTPATIENT
Start: 2021-07-28 | End: 2021-09-15

## 2021-07-28 RX ORDER — FUROSEMIDE 20 MG/1
20 TABLET ORAL DAILY
Qty: 14 TABLET | Refills: 0 | Status: SHIPPED | OUTPATIENT
Start: 2021-07-28 | End: 2021-09-13

## 2021-08-02 RX ORDER — LISINOPRIL 5 MG/1
5 TABLET ORAL DAILY
Qty: 90 TABLET | OUTPATIENT
Start: 2021-08-02

## 2021-08-02 RX ORDER — LISINOPRIL 5 MG/1
5 TABLET ORAL DAILY
Qty: 30 TABLET | Refills: 0 | Status: SHIPPED | OUTPATIENT
Start: 2021-08-02 | End: 2021-08-30

## 2021-08-12 ENCOUNTER — TELEPHONE (OUTPATIENT)
Dept: FAMILY MEDICINE | Age: 66
End: 2021-08-12

## 2021-08-13 ENCOUNTER — OFFICE VISIT (OUTPATIENT)
Dept: FAMILY MEDICINE | Age: 66
End: 2021-08-13

## 2021-08-13 VITALS
BODY MASS INDEX: 39.52 KG/M2 | WEIGHT: 291.8 LBS | HEART RATE: 79 BPM | RESPIRATION RATE: 16 BRPM | DIASTOLIC BLOOD PRESSURE: 82 MMHG | HEIGHT: 72 IN | SYSTOLIC BLOOD PRESSURE: 124 MMHG | TEMPERATURE: 97.9 F

## 2021-08-13 DIAGNOSIS — M54.50 ACUTE MIDLINE LOW BACK PAIN WITHOUT SCIATICA: Primary | ICD-10-CM

## 2021-08-13 PROCEDURE — 99214 OFFICE O/P EST MOD 30 MIN: CPT | Performed by: FAMILY MEDICINE

## 2021-08-13 RX ORDER — CYCLOBENZAPRINE HCL 10 MG
10 TABLET ORAL 3 TIMES DAILY PRN
Qty: 30 TABLET | Refills: 0 | Status: SHIPPED | OUTPATIENT
Start: 2021-08-13

## 2021-08-13 RX ORDER — METHYLPREDNISOLONE 4 MG/1
TABLET ORAL
Qty: 21 TABLET | Refills: 0 | Status: SHIPPED | OUTPATIENT
Start: 2021-08-13 | End: 2021-09-01 | Stop reason: ALTCHOICE

## 2021-08-13 RX ORDER — METHOCARBAMOL 750 MG/1
TABLET, FILM COATED ORAL
COMMUNITY
Start: 2020-10-28 | End: 2021-09-01 | Stop reason: ALTCHOICE

## 2021-08-13 ASSESSMENT — PATIENT HEALTH QUESTIONNAIRE - PHQ9
1. LITTLE INTEREST OR PLEASURE IN DOING THINGS: NOT AT ALL
CLINICAL INTERPRETATION OF PHQ9 SCORE: NO FURTHER SCREENING NEEDED
SUM OF ALL RESPONSES TO PHQ9 QUESTIONS 1 AND 2: 0
CLINICAL INTERPRETATION OF PHQ2 SCORE: NO FURTHER SCREENING NEEDED
SUM OF ALL RESPONSES TO PHQ9 QUESTIONS 1 AND 2: 0
2. FEELING DOWN, DEPRESSED OR HOPELESS: NOT AT ALL

## 2021-08-19 ASSESSMENT — ENCOUNTER SYMPTOMS
FEVER: 0
CHILLS: 0
NUMBNESS: 0
ROS GI COMMENTS: NO BOWEL INCONTINENCE
WEAKNESS: 0
BACK PAIN: 1

## 2021-08-29 DIAGNOSIS — E78.2 MIXED HYPERLIPIDEMIA: ICD-10-CM

## 2021-08-30 DIAGNOSIS — E78.2 MIXED HYPERLIPIDEMIA: ICD-10-CM

## 2021-08-30 DIAGNOSIS — M48.07 STENOSIS OF LATERAL RECESS OF LUMBOSACRAL SPINE: ICD-10-CM

## 2021-08-30 DIAGNOSIS — M12.9 ARTHRITIS INVOLVING MULTIPLE SITES: ICD-10-CM

## 2021-08-30 RX ORDER — LISINOPRIL 5 MG/1
5 TABLET ORAL DAILY
Qty: 90 TABLET | OUTPATIENT
Start: 2021-08-30

## 2021-08-30 RX ORDER — SIMVASTATIN 40 MG
40 TABLET ORAL NIGHTLY
Qty: 30 TABLET | Refills: 0 | Status: SHIPPED | OUTPATIENT
Start: 2021-08-30 | End: 2021-09-27

## 2021-08-30 RX ORDER — SIMVASTATIN 40 MG
TABLET ORAL
Qty: 90 TABLET | OUTPATIENT
Start: 2021-08-30

## 2021-08-30 RX ORDER — CELECOXIB 200 MG/1
200 CAPSULE ORAL DAILY
Qty: 30 CAPSULE | Refills: 0 | Status: SHIPPED | OUTPATIENT
Start: 2021-08-30 | End: 2021-09-27

## 2021-08-30 RX ORDER — LISINOPRIL 5 MG/1
5 TABLET ORAL DAILY
Qty: 30 TABLET | Refills: 0 | Status: SHIPPED | OUTPATIENT
Start: 2021-08-30 | End: 2021-09-27

## 2021-08-30 RX ORDER — CELECOXIB 200 MG/1
200 CAPSULE ORAL DAILY
Qty: 90 CAPSULE | OUTPATIENT
Start: 2021-08-30

## 2021-08-31 ASSESSMENT — PATIENT HEALTH QUESTIONNAIRE - PHQ9
CLINICAL INTERPRETATION OF PHQ2 SCORE: 0
1. LITTLE INTEREST OR PLEASURE IN DOING THINGS: NOT AT ALL
CLINICAL INTERPRETATION OF PHQ2 SCORE: NO FURTHER SCREENING NEEDED
2. FEELING DOWN, DEPRESSED OR HOPELESS: NOT AT ALL

## 2021-09-01 ENCOUNTER — OFFICE VISIT (OUTPATIENT)
Dept: FAMILY MEDICINE | Age: 66
End: 2021-09-01

## 2021-09-01 VITALS
WEIGHT: 292.4 LBS | RESPIRATION RATE: 14 BRPM | TEMPERATURE: 98.2 F | HEIGHT: 72 IN | SYSTOLIC BLOOD PRESSURE: 136 MMHG | BODY MASS INDEX: 39.6 KG/M2 | OXYGEN SATURATION: 98 % | HEART RATE: 104 BPM | DIASTOLIC BLOOD PRESSURE: 80 MMHG

## 2021-09-01 DIAGNOSIS — I10 ESSENTIAL HYPERTENSION: ICD-10-CM

## 2021-09-01 DIAGNOSIS — E78.2 MIXED HYPERLIPIDEMIA: ICD-10-CM

## 2021-09-01 DIAGNOSIS — F41.9 ANXIETY: ICD-10-CM

## 2021-09-01 DIAGNOSIS — Z00.00 MEDICARE ANNUAL WELLNESS VISIT, SUBSEQUENT: Primary | ICD-10-CM

## 2021-09-01 DIAGNOSIS — Z12.31 VISIT FOR SCREENING MAMMOGRAM: ICD-10-CM

## 2021-09-01 DIAGNOSIS — Z23 NEED FOR VACCINATION: ICD-10-CM

## 2021-09-01 PROCEDURE — G0009 ADMIN PNEUMOCOCCAL VACCINE: HCPCS | Performed by: FAMILY MEDICINE

## 2021-09-01 PROCEDURE — 90732 PPSV23 VACC 2 YRS+ SUBQ/IM: CPT | Performed by: FAMILY MEDICINE

## 2021-09-01 PROCEDURE — G0439 PPPS, SUBSEQ VISIT: HCPCS | Performed by: FAMILY MEDICINE

## 2021-09-01 PROCEDURE — 99214 OFFICE O/P EST MOD 30 MIN: CPT | Performed by: FAMILY MEDICINE

## 2021-09-01 RX ORDER — DULOXETIN HYDROCHLORIDE 30 MG/1
30 CAPSULE, DELAYED RELEASE ORAL DAILY
Qty: 30 CAPSULE | Refills: 1 | Status: SHIPPED | OUTPATIENT
Start: 2021-09-01 | End: 2021-10-11

## 2021-09-01 ASSESSMENT — ANXIETY QUESTIONNAIRES
1. FEELING NERVOUS, ANXIOUS, OR ON EDGE: 1
7. FEELING AFRAID AS IF SOMETHING AWFUL MIGHT HAPPEN: MORE THAN HALF THE DAYS
2. NOT BEING ABLE TO STOP OR CONTROL WORRYING: 3
3. WORRYING TOO MUCH ABOUT DIFFERENT THINGS: 3
6. BECOMING EASILY ANNOYED OR IRRITABLE: SEVERAL DAYS
1. FEELING NERVOUS, ANXIOUS, OR ON EDGE: SEVERAL DAYS
GAD7 TOTAL SCORE: 14
7. FEELING AFRAID AS IF SOMETHING AWFUL MIGHT HAPPEN: 2
2. NOT BEING ABLE TO STOP OR CONTROL WORRYING: NEARLY EVERY DAY
5. BEING SO RESTLESS THAT IT IS HARD TO SIT STILL: 1
4. TROUBLE RELAXING: 3
3. WORRYING TOO MUCH ABOUT DIFFERENT THINGS: NEARLY EVERY DAY
6. BECOMING EASILY ANNOYED OR IRRITABLE: 1
5. BEING SO RESTLESS THAT IT IS HARD TO SIT STILL: SEVERAL DAYS
4. TROUBLE RELAXING: NEARLY EVERY DAY

## 2021-09-01 ASSESSMENT — VISUAL ACUITY
OS_CC: 20/20
OD_CC: 20/20

## 2021-09-03 ASSESSMENT — ENCOUNTER SYMPTOMS
WEAKNESS: 0
COUGH: 0
WOUND: 0
VOMITING: 0
NERVOUS/ANXIOUS: 1
LIGHT-HEADEDNESS: 0
CONSTIPATION: 0
NUMBNESS: 0
FATIGUE: 0
SORE THROAT: 0
ABDOMINAL PAIN: 0
DIZZINESS: 0
RHINORRHEA: 0
DIARRHEA: 0
SHORTNESS OF BREATH: 0
FEVER: 0
HEADACHES: 0
NAUSEA: 0
CHILLS: 0

## 2021-09-11 DIAGNOSIS — I10 BENIGN ESSENTIAL HTN: ICD-10-CM

## 2021-09-13 DIAGNOSIS — I10 BENIGN ESSENTIAL HTN: ICD-10-CM

## 2021-09-13 RX ORDER — FUROSEMIDE 20 MG/1
20 TABLET ORAL DAILY
Qty: 30 TABLET | Refills: 0 | Status: SHIPPED | OUTPATIENT
Start: 2021-09-13 | End: 2021-10-11

## 2021-09-13 RX ORDER — FUROSEMIDE 20 MG/1
20 TABLET ORAL DAILY
Qty: 90 TABLET | OUTPATIENT
Start: 2021-09-13

## 2021-09-14 DIAGNOSIS — I10 BENIGN ESSENTIAL HTN: ICD-10-CM

## 2021-09-15 DIAGNOSIS — I10 BENIGN ESSENTIAL HTN: ICD-10-CM

## 2021-09-15 RX ORDER — METOPROLOL SUCCINATE 25 MG/1
25 TABLET, EXTENDED RELEASE ORAL DAILY
Qty: 30 TABLET | Refills: 0 | Status: SHIPPED | OUTPATIENT
Start: 2021-09-15 | End: 2021-10-11

## 2021-09-15 RX ORDER — METOPROLOL SUCCINATE 25 MG/1
25 TABLET, EXTENDED RELEASE ORAL DAILY
Qty: 90 TABLET | OUTPATIENT
Start: 2021-09-15

## 2021-09-21 ENCOUNTER — LAB SERVICES (OUTPATIENT)
Dept: LAB | Age: 66
End: 2021-09-21

## 2021-09-21 ENCOUNTER — HOSPITAL ENCOUNTER (OUTPATIENT)
Dept: MAMMOGRAPHY | Age: 66
Discharge: HOME | End: 2021-09-21
Attending: FAMILY MEDICINE

## 2021-09-21 DIAGNOSIS — I10 ESSENTIAL HYPERTENSION: ICD-10-CM

## 2021-09-21 DIAGNOSIS — Z12.31 VISIT FOR SCREENING MAMMOGRAM: ICD-10-CM

## 2021-09-21 DIAGNOSIS — E78.2 MIXED HYPERLIPIDEMIA: ICD-10-CM

## 2021-09-21 LAB
ANION GAP SERPL CALC-SCNC: 15 MMOL/L (ref 10–20)
BUN SERPL-MCNC: 11 MG/DL (ref 6–20)
BUN/CREAT SERPL: 13 (ref 7–25)
CALCIUM SERPL-MCNC: 9.3 MG/DL (ref 8.4–10.2)
CHLORIDE SERPL-SCNC: 102 MMOL/L (ref 98–107)
CHOLEST SERPL-MCNC: 196 MG/DL
CHOLEST/HDLC SERPL: 3.4 {RATIO}
CO2 SERPL-SCNC: 27 MMOL/L (ref 21–32)
CREAT SERPL-MCNC: 0.88 MG/DL (ref 0.51–0.95)
FASTING DURATION TIME PATIENT: 17 HOURS (ref 0–999)
FASTING DURATION TIME PATIENT: 17 HOURS (ref 0–999)
GFR SERPLBLD BASED ON 1.73 SQ M-ARVRAT: 68 ML/MIN
GLUCOSE SERPL-MCNC: 95 MG/DL (ref 65–99)
HDLC SERPL-MCNC: 57 MG/DL
LDLC SERPL CALC-MCNC: 103 MG/DL
NONHDLC SERPL-MCNC: 139 MG/DL
POTASSIUM SERPL-SCNC: 4.1 MMOL/L (ref 3.4–5.1)
SODIUM SERPL-SCNC: 140 MMOL/L (ref 135–145)
TRIGL SERPL-MCNC: 182 MG/DL

## 2021-09-21 PROCEDURE — 77063 BREAST TOMOSYNTHESIS BI: CPT

## 2021-09-21 PROCEDURE — 77067 SCR MAMMO BI INCL CAD: CPT | Performed by: RADIOLOGY

## 2021-09-21 PROCEDURE — 36415 COLL VENOUS BLD VENIPUNCTURE: CPT | Performed by: INTERNAL MEDICINE

## 2021-09-21 PROCEDURE — 80048 BASIC METABOLIC PNL TOTAL CA: CPT | Performed by: CLINICAL MEDICAL LABORATORY

## 2021-09-21 PROCEDURE — 77063 BREAST TOMOSYNTHESIS BI: CPT | Performed by: RADIOLOGY

## 2021-09-21 PROCEDURE — 80061 LIPID PANEL: CPT | Performed by: CLINICAL MEDICAL LABORATORY

## 2021-09-24 DIAGNOSIS — M12.9 ARTHRITIS INVOLVING MULTIPLE SITES: ICD-10-CM

## 2021-09-24 DIAGNOSIS — M48.07 STENOSIS OF LATERAL RECESS OF LUMBOSACRAL SPINE: ICD-10-CM

## 2021-09-24 DIAGNOSIS — E78.2 MIXED HYPERLIPIDEMIA: ICD-10-CM

## 2021-09-27 RX ORDER — SIMVASTATIN 40 MG
40 TABLET ORAL NIGHTLY
Qty: 90 TABLET | Refills: 3 | Status: SHIPPED | OUTPATIENT
Start: 2021-09-27 | End: 2022-09-06

## 2021-09-27 RX ORDER — LISINOPRIL 5 MG/1
5 TABLET ORAL DAILY
Qty: 90 TABLET | Refills: 3 | Status: SHIPPED | OUTPATIENT
Start: 2021-09-27 | End: 2022-09-02 | Stop reason: SINTOL

## 2021-09-27 RX ORDER — CELECOXIB 200 MG/1
200 CAPSULE ORAL DAILY
Qty: 90 CAPSULE | Refills: 3 | Status: SHIPPED | OUTPATIENT
Start: 2021-09-27 | End: 2022-09-06

## 2021-09-27 RX ORDER — DICYCLOMINE HCL 20 MG
20 TABLET ORAL
Qty: 120 TABLET | Refills: 2 | Status: SHIPPED | OUTPATIENT
Start: 2021-09-27 | End: 2022-07-12

## 2021-10-09 DIAGNOSIS — I10 BENIGN ESSENTIAL HTN: ICD-10-CM

## 2021-10-10 DIAGNOSIS — F41.9 ANXIETY: ICD-10-CM

## 2021-10-11 DIAGNOSIS — I10 BENIGN ESSENTIAL HTN: ICD-10-CM

## 2021-10-11 RX ORDER — FUROSEMIDE 20 MG/1
20 TABLET ORAL DAILY
Qty: 90 TABLET | Refills: 1 | Status: SHIPPED | OUTPATIENT
Start: 2021-10-11 | End: 2022-03-15

## 2021-10-11 RX ORDER — METOPROLOL SUCCINATE 25 MG/1
25 TABLET, EXTENDED RELEASE ORAL DAILY
Qty: 90 TABLET | Refills: 3 | Status: SHIPPED | OUTPATIENT
Start: 2021-10-11 | End: 2022-09-28

## 2021-10-11 RX ORDER — DULOXETIN HYDROCHLORIDE 30 MG/1
30 CAPSULE, DELAYED RELEASE ORAL DAILY
Qty: 90 CAPSULE | Refills: 0 | Status: SHIPPED | OUTPATIENT
Start: 2021-10-11 | End: 2022-01-25

## 2021-10-12 DIAGNOSIS — N39.0 RECURRENT UTI: ICD-10-CM

## 2021-10-13 RX ORDER — CEPHALEXIN 250 MG/1
CAPSULE ORAL
Qty: 30 CAPSULE | Refills: 1 | Status: SHIPPED | OUTPATIENT
Start: 2021-10-13 | End: 2022-07-12

## 2022-01-22 DIAGNOSIS — F41.9 ANXIETY: ICD-10-CM

## 2022-01-24 ENCOUNTER — E-ADVICE (OUTPATIENT)
Dept: FAMILY MEDICINE | Age: 67
End: 2022-01-24

## 2022-01-24 ASSESSMENT — ANXIETY QUESTIONNAIRES
5. BEING SO RESTLESS THAT IT IS HARD TO SIT STILL: NOT AT ALL
3. WORRYING TOO MUCH ABOUT DIFFERENT THINGS: 0 - NOT AT ALL
2. NOT BEING ABLE TO STOP OR CONTROL WORRYING: NOT AT ALL
4. TROUBLE RELAXING: 0 - NOT AT ALL
6. BECOMING EASILY ANNOYED OR IRRITABLE: SEVERAL DAYS
7. FEELING AFRAID AS IF SOMETHING AWFUL MIGHT HAPPEN: SEVERAL DAYS
5. BEING SO RESTLESS THAT IT IS HARD TO SIT STILL: 0 - NOT AT ALL
2. NOT BEING ABLE TO STOP OR CONTROL WORRYING: 0 - NOT AT ALL
6. BECOMING EASILY ANNOYED OR IRRITABLE: 1 - SEVERAL DAYS
1. FEELING NERVOUS, ANXIOUS, OR ON EDGE: 0 - NOT AT ALL
7. FEELING AFRAID AS IF SOMETHING AWFUL MIGHT HAPPEN: 1 - SEVERAL DAYS
GAD7 TOTAL SCORE: 2
4. TROUBLE RELAXING: NOT AT ALL
IF YOU CHECKED OFF ANY PROBLEMS ON THIS QUESTIONNAIRE, HOW DIFFICULT HAVE THESE PROBLEMS MADE IT FOR YOU TO DO YOUR WORK, TAKE CARE OF THINGS AT HOME, OR GET ALONG WITH OTHER PEOPLE: SOMEWHAT DIFFICULT
GAD7 TOTAL SCORE: 2
IF YOU CHECKED OFF ANY PROBLEMS ON THIS QUESTIONNAIRE, HOW DIFFICULT HAVE THESE PROBLEMS MADE IT FOR YOU TO DO YOUR WORK, TAKE CARE OF THINGS AT HOME, OR GET ALONG WITH OTHER PEOPLE: SOMEWHAT DIFFICULT
3. WORRYING TOO MUCH ABOUT DIFFERENT THINGS: NOT AT ALL
1. FEELING NERVOUS, ANXIOUS, OR ON EDGE: NOT AT ALL

## 2022-01-25 RX ORDER — DULOXETIN HYDROCHLORIDE 30 MG/1
30 CAPSULE, DELAYED RELEASE ORAL DAILY
Qty: 90 CAPSULE | Refills: 1 | Status: SHIPPED | OUTPATIENT
Start: 2022-01-25 | End: 2022-07-12

## 2022-03-14 DIAGNOSIS — I10 BENIGN ESSENTIAL HTN: ICD-10-CM

## 2022-03-15 RX ORDER — FUROSEMIDE 20 MG/1
20 TABLET ORAL DAILY
Qty: 90 TABLET | Refills: 1 | Status: SHIPPED | OUTPATIENT
Start: 2022-03-27

## 2022-04-19 ENCOUNTER — EXTERNAL RECORD (OUTPATIENT)
Dept: OTHER | Age: 67
End: 2022-04-19

## 2022-07-11 DIAGNOSIS — N39.0 RECURRENT UTI: ICD-10-CM

## 2022-07-11 DIAGNOSIS — F41.9 ANXIETY: ICD-10-CM

## 2022-07-11 RX ORDER — SPIRONOLACTONE 25 MG/1
12.5 TABLET ORAL DAILY
COMMUNITY
Start: 2022-06-17

## 2022-07-12 RX ORDER — DULOXETIN HYDROCHLORIDE 30 MG/1
30 CAPSULE, DELAYED RELEASE ORAL DAILY
Qty: 90 CAPSULE | Refills: 0 | Status: SHIPPED | OUTPATIENT
Start: 2022-07-12 | End: 2022-11-21

## 2022-07-12 RX ORDER — DICYCLOMINE HCL 20 MG
20 TABLET ORAL
Qty: 120 TABLET | Refills: 2 | Status: SHIPPED | OUTPATIENT
Start: 2022-07-12

## 2022-07-12 RX ORDER — CEPHALEXIN 250 MG/1
CAPSULE ORAL
Qty: 30 CAPSULE | Refills: 0 | Status: SHIPPED | OUTPATIENT
Start: 2022-07-12

## 2022-07-15 RX ORDER — SPIRONOLACTONE 50 MG/1
TABLET, FILM COATED ORAL
Qty: 90 TABLET | OUTPATIENT
Start: 2022-07-15

## 2022-08-26 ASSESSMENT — PATIENT HEALTH QUESTIONNAIRE - PHQ9
SUM OF ALL RESPONSES TO PHQ9 QUESTIONS 1 AND 2: 0
1. LITTLE INTEREST OR PLEASURE IN DOING THINGS: NOT AT ALL
CLINICAL INTERPRETATION OF PHQ2 SCORE: NO FURTHER SCREENING NEEDED
2. FEELING DOWN, DEPRESSED OR HOPELESS: NOT AT ALL
CLINICAL INTERPRETATION OF PHQ2 SCORE: 0

## 2022-09-02 ENCOUNTER — OFFICE VISIT (OUTPATIENT)
Dept: FAMILY MEDICINE | Age: 67
End: 2022-09-02

## 2022-09-02 VITALS
SYSTOLIC BLOOD PRESSURE: 118 MMHG | TEMPERATURE: 98.4 F | HEART RATE: 86 BPM | DIASTOLIC BLOOD PRESSURE: 64 MMHG | WEIGHT: 293 LBS | BODY MASS INDEX: 39.68 KG/M2 | OXYGEN SATURATION: 95 % | HEIGHT: 72 IN

## 2022-09-02 DIAGNOSIS — M48.07 STENOSIS OF LATERAL RECESS OF LUMBOSACRAL SPINE: ICD-10-CM

## 2022-09-02 DIAGNOSIS — Z23 NEED FOR VACCINATION: ICD-10-CM

## 2022-09-02 DIAGNOSIS — Z00.00 MEDICARE ANNUAL WELLNESS VISIT, SUBSEQUENT: Primary | ICD-10-CM

## 2022-09-02 DIAGNOSIS — E66.01 OBESITY, MORBID (CMD): ICD-10-CM

## 2022-09-02 DIAGNOSIS — F41.9 ANXIETY: ICD-10-CM

## 2022-09-02 DIAGNOSIS — I10 ESSENTIAL HYPERTENSION: ICD-10-CM

## 2022-09-02 DIAGNOSIS — E78.2 MIXED HYPERLIPIDEMIA: ICD-10-CM

## 2022-09-02 PROCEDURE — 90677 PCV20 VACCINE IM: CPT | Performed by: FAMILY MEDICINE

## 2022-09-02 PROCEDURE — G0009 ADMIN PNEUMOCOCCAL VACCINE: HCPCS | Performed by: FAMILY MEDICINE

## 2022-09-02 PROCEDURE — G0439 PPPS, SUBSEQ VISIT: HCPCS | Performed by: FAMILY MEDICINE

## 2022-09-02 PROCEDURE — G0008 ADMIN INFLUENZA VIRUS VAC: HCPCS | Performed by: FAMILY MEDICINE

## 2022-09-02 PROCEDURE — 90662 IIV NO PRSV INCREASED AG IM: CPT | Performed by: FAMILY MEDICINE

## 2022-09-02 PROCEDURE — 99214 OFFICE O/P EST MOD 30 MIN: CPT | Performed by: FAMILY MEDICINE

## 2022-09-02 RX ORDER — ALBUTEROL SULFATE 90 UG/1
AEROSOL, METERED RESPIRATORY (INHALATION)
COMMUNITY
Start: 2022-06-14

## 2022-09-02 RX ORDER — BENZYL ALCOHOL, LIDOCAINE HYDROCHLORIDE 10; 4 G/100G; G/100G
CREAM PERCUTANEOUS; TOPICAL; TRANSDERMAL
COMMUNITY
Start: 2022-06-13 | End: 2022-09-02 | Stop reason: ALTCHOICE

## 2022-09-02 RX ORDER — OMEPRAZOLE 20 MG/1
20 CAPSULE, DELAYED RELEASE ORAL DAILY
Refills: 0 | Status: CANCELLED | COMMUNITY
Start: 2022-09-02

## 2022-09-02 RX ORDER — OMEPRAZOLE 20 MG/1
20 CAPSULE, DELAYED RELEASE ORAL DAILY
COMMUNITY

## 2022-09-02 RX ORDER — LOSARTAN POTASSIUM 50 MG/1
50 TABLET ORAL DAILY
COMMUNITY
Start: 2022-08-03

## 2022-09-02 ASSESSMENT — ENCOUNTER SYMPTOMS
HEADACHES: 0
CHILLS: 0
SORE THROAT: 0
RHINORRHEA: 0
FEVER: 0
SHORTNESS OF BREATH: 1
NAUSEA: 0
DIZZINESS: 0
FATIGUE: 0
LIGHT-HEADEDNESS: 1
VOMITING: 0
WEAKNESS: 0
ABDOMINAL PAIN: 0
COUGH: 0
WOUND: 0
DIARRHEA: 0
NUMBNESS: 0
CONSTIPATION: 0

## 2022-09-02 ASSESSMENT — ANXIETY QUESTIONNAIRES
GAD7 TOTAL SCORE: 0
2. NOT BEING ABLE TO STOP OR CONTROL WORRYING: NOT AT ALL
4. TROUBLE RELAXING: 0
6. BECOMING EASILY ANNOYED OR IRRITABLE: NOT AT ALL
4. TROUBLE RELAXING: NOT AT ALL
1. FEELING NERVOUS, ANXIOUS, OR ON EDGE: NOT AT ALL
5. BEING SO RESTLESS THAT IT IS HARD TO SIT STILL: 0
1. FEELING NERVOUS, ANXIOUS, OR ON EDGE: 0
3. WORRYING TOO MUCH ABOUT DIFFERENT THINGS: 0
2. NOT BEING ABLE TO STOP OR CONTROL WORRYING: 0
6. BECOMING EASILY ANNOYED OR IRRITABLE: 0
7. FEELING AFRAID AS IF SOMETHING AWFUL MIGHT HAPPEN: 0
7. FEELING AFRAID AS IF SOMETHING AWFUL MIGHT HAPPEN: NOT AT ALL
5. BEING SO RESTLESS THAT IT IS HARD TO SIT STILL: NOT AT ALL
3. WORRYING TOO MUCH ABOUT DIFFERENT THINGS: NOT AT ALL

## 2022-09-02 ASSESSMENT — PATIENT HEALTH QUESTIONNAIRE - PHQ9
CLINICAL INTERPRETATION OF PHQ2 SCORE: NO FURTHER SCREENING NEEDED
1. LITTLE INTEREST OR PLEASURE IN DOING THINGS: NOT AT ALL
SUM OF ALL RESPONSES TO PHQ9 QUESTIONS 1 AND 2: 0
SUM OF ALL RESPONSES TO PHQ9 QUESTIONS 1 AND 2: 0
2. FEELING DOWN, DEPRESSED OR HOPELESS: NOT AT ALL

## 2022-09-06 DIAGNOSIS — M12.9 ARTHRITIS INVOLVING MULTIPLE SITES: ICD-10-CM

## 2022-09-06 DIAGNOSIS — E78.2 MIXED HYPERLIPIDEMIA: ICD-10-CM

## 2022-09-06 DIAGNOSIS — M48.07 STENOSIS OF LATERAL RECESS OF LUMBOSACRAL SPINE: ICD-10-CM

## 2022-09-06 RX ORDER — SIMVASTATIN 40 MG
40 TABLET ORAL NIGHTLY
Qty: 90 TABLET | Refills: 0 | Status: SHIPPED | OUTPATIENT
Start: 2022-09-06 | End: 2022-12-06

## 2022-09-06 RX ORDER — CELECOXIB 200 MG/1
200 CAPSULE ORAL DAILY
Qty: 90 CAPSULE | Refills: 0 | Status: SHIPPED | OUTPATIENT
Start: 2022-09-06 | End: 2022-12-06

## 2022-09-28 DIAGNOSIS — I10 BENIGN ESSENTIAL HTN: ICD-10-CM

## 2022-09-28 RX ORDER — METOPROLOL SUCCINATE 25 MG/1
25 TABLET, EXTENDED RELEASE ORAL DAILY
Qty: 90 TABLET | Refills: 3 | Status: SHIPPED | OUTPATIENT
Start: 2022-09-28

## 2022-10-27 ENCOUNTER — APPOINTMENT (OUTPATIENT)
Dept: PHYSICAL THERAPY | Age: 67
End: 2022-10-27
Payer: MEDICARE

## 2022-11-03 ENCOUNTER — HOSPITAL ENCOUNTER (OUTPATIENT)
Dept: PHYSICAL THERAPY | Age: 67
Discharge: HOME | End: 2022-11-03
Payer: MEDICARE

## 2022-11-03 PROCEDURE — 97162 PT EVAL MOD COMPLEX 30 MIN: CPT

## 2022-11-03 PROCEDURE — 97535 SELF CARE MNGMENT TRAINING: CPT

## 2022-11-03 NOTE — PROGRESS NOTES
PHYSICAL THERAPY - DAILY TREATMENT NOTE    Patient Name: Leydi Guajardo        Date: 11/3/2022  : 1955   yes Patient  Verified  Visit #:   1     Insurance: Payor: Fred Gould / Plan: [place] MED[place] PART [place] & B / Product Type: Medicare /      In time: 10:06 Out time: 11:02   Total Treatment Time: 56     Medicare/BCBS Time Tracking (below)   Total Timed Codes (min):  15 1:1 Treatment Time:  56     TREATMENT AREA =  Gait instability [R26.81]    SUBJECTIVE  Pain Level (on 0 to 10 scale):  0  / 10   Medication Changes/New allergies or changes in medical history, any new surgeries or procedures?    no  If yes, update Summary List   Subjective Functional Status/Changes:  []  No changes reported     See POC          OBJECTIVE      41 min [x]Eval                  []Re-Eval       See exam on chart for details on objective findings       5 min Neuromuscular Re-education:  see flow sheet   Rationale: exercises designed to improve and/or maintain balance, coordination, kinesthetic sense, posture, and/or proprioception for functional activities to improve the patients ability to function in daily life      10 min Self Care: Pt educated in balance system anatomy, function and dysfunction as related to diagnosis. Reviewed DGI score indicating increased fall risk and ed on caution with low lighting, uneven terrain and long distance ambulation. Reviewed POC, prognosis for treatment and goals. Pt reports understanding. Reviewed pt's current HEP from previous PT. Rationale:    pt education related to ADL performance, compensatory training, safety procedures, wound care/edema management, and adaptive equipment and/or assistive technology devices to improve pt's safety and functional independence.        Billed With/As:   [] TE   [] TA   [x] Neuro   [] Self Care Patient Education: [x] Review HEP    [] Progressed/Changed HEP based on:   [] positioning   [] body mechanics   [] transfers   [] heat/ice application [] other:        Other Objective/Functional Measures:      See POC     Post Treatment Pain Level (on 0 to 10) scale:   0  / 10     ASSESSMENT  Assessment/Changes in Function:     See POC     []  See Progress Note/Recertification   Patient will continue to benefit from skilled PT services to modify and progress therapeutic interventions, address functional mobility deficits, address ROM deficits, address strength deficits, analyze and address soft tissue restrictions, analyze and cue movement patterns, analyze and modify body mechanics/ergonomics, assess and modify postural abnormalities, address imbalance/dizziness, and instruct in home and community integration to attain remaining goals. Progress toward goals / Updated goals:    See POC     PLAN  []  Upgrade activities as tolerated yes Continue plan of care   []  Discharge due to :    []  Other:      Therapist: Alex Aparicio.  Obed Hand, PT    Date: 11/3/2022 Time: 8:33 AM     Future Appointments   Date Time Provider Mina Ramos   11/9/2022 10:00 AM Damaris Raj., PT [facility]   11/11/2022 11:30 AM Melisa Wynn, PTA [facility]   11/14/2022 11:00 AM Merline RICHARD, PT [facility]   11/16/2022 11:00 AM Willie Latham [facility]   11/22/2022 11:30 AM Damaris Raj., PT [facility]   11/28/2022 11:00 AM Damaris Raj., PT [facility]   11/30/2022 11:00 AM Jayaey Pimple [facility]   12/5/2022 11:00 AM Damaris Raj., PT [facility]

## 2022-11-03 NOTE — PROGRESS NOTES
[address] [facility] PHYSICAL THERAPY AT [address], [address] [place] [address] Road  Phone: [phone]   [phone]  PLAN OF CARE / [address] [place] [address] PHYSICAL THERAPY SERVICES  Patient Name: Corrie William : 1955   Medical   Diagnosis: Gait instability [R26.81]  Polyarthralgia [M25.50] Treatment Diagnosis: Gait instability [R26.81]  Polyarthralgia [M25.50]   Onset Date: 2018     Referral Source: Shivani Vang MD [facility]): 11/3/2022   Prior Hospitalization: See medical history Provider #: [id number]   Prior Level of Function: Amb. Without AD; occasional use of rw for community/prolonged walking. Works part time as a ; relatively sedentary lifestyle. 1 story home with 1 RADHA. (Summer home in [place] with 4 RADHA and 2nd story)   Comorbidities: HTN, arthritis, pulmonary fibrosis, sleep apnea, L TKA , R TKA , BMI >30, cataracts   Medications: Verified on Patient Summary List   The Plan of Care and following information is based on the information from the initial evaluation.   ===========================================================================================  Assessment / key information:  Pt is a 75y.o. year old female with subjective complaints of unsteadiness and imbalance which she states has been ongoing since L TKA performed in 2018. Pt states she did PT recently for 15 sessions with noted improvement, however has been back in state for 1 month and notices gradual decline. Pt has had vertigo 2022 which severely impacted balance; she was seen by MD and after taking decongestants/antihistamines sx's cleared up. Pt reports occasional N/T to Left foot which she attributes to chronic LBP. LLE rarely riddhi (\"usually when going down a curb but I haven't fallen because of that\").   Day before yesterday pt was having word finding difficulty, imbalance with level ground walking, and aura almost like having a migraine; \"I was just off\" (PT educated pt on signs of a stroke and issued educational handout as per chart copy); she denies any of these sx's currently. Current dizziness: 3-4/10 max dizziness (occasionally if sits up too fast), 0/10 least/average dizziness. Current Pain: 0/10, Max pain 2-3/10, Least pain 0/10. L knee and low back  Current functional limitations: walking (unsteady), prolonged standing, stairs  FOTO score= 44/100 indicating 56% impairment to functional activities. Today's evaulation is significant for   1) Postural Assessment: significant b/l genu valgum    Gait Assessment: Ambulates without AD, decreased gait speed, decreased push off b/l, short, shuffled steps  Transfers: multiple attempts for Sit to stand without UE's and significant b/l genu valgum; dizziness on standing (improved after could minutes standing)     2) Standing balance Assessment: STATIC:  Romberg (as narrowed as possible with significant genu valgum) EO/EC floor 30/30 (increased ankle strategy). MSR 0-1\" b/l. Foam Romberg EO/EC 30\"/4\"  (pt with increased fear requiring several attempts)    DYNAMIC:  DGI 16/24. 3) VOMS: Smooth pursuit WNL. Saccades WNL. NPC 3 cm (decreased L eye convergence noted <15 cm). VOR x10 reps WNL vertically; horizontally pt c/o \"feels off\" 1/10 dizziness    4) STRENGTH: Hip flex 4-/5 b/l.  Knee ext/flex 5/5 b/l.  DF 4+/5 b/.  Glutes/gastroc/soleus not formally tested but appear functionally weak    6) ADDITIONAL FINDINGS: VITALS:  HR= 83;  BP= 147/80 mmHg. (After performing static standing on floor). Pt with limited cardiovascular endurance requiring occasional rests for SOB. CERVICAL ROM Flex WNL, Ext 25% (p!). SB 50% b/l (neck pain to L). ROT R WNL, L 50% (L neck pain)      These findings are supportive for diagnosis of unsteadiness on feet.   Pt will be a good candidate for skilled PT to address these impairments and promote return to normal ADLs and functional mobility for improved quality of life.    ===========================================================================================  Eval Complexity: History HIGH Complexity :3+ comorbidities / personal factors will impact the outcome/ POC ;  Examination  HIGH Complexity : 4+ Standardized tests and measures addressing body structure, function, activity limitation and / or participation in recreation ; Presentation MEDIUM Complexity : Evolving with changing characteristics ; Decision Making MEDIUM Complexity : FOTO score of 26-74; Overall Complexity MEDIUM  Problem List: pain affecting function, decrease ROM, decrease strength, impaired gait/ balance, decrease ADL/ functional abilitiies, decrease activity tolerance, decrease flexibility/ joint mobility, and decrease transfer abilities   Treatment Plan may include any combination of the following: Therapeutic exercise, Neuromuscular reeducation, Manual therapy, Therapeutic activity, Self care/home management, Electric stim unattended , Gait training, Ultrasound, Mechanical traction, Electric stim attended, and Canalith repositioning  Patient / Family readiness to learn indicated by: asking questions, trying to perform skills, and interest  Persons(s) to be included in education: patient (P)  Barriers to Learning/Limitations: None  Measures taken, if barriers to learning:    Patient Goal (s): \"Strengthening improved balance relief from muscle aches\"   Patient self reported health status: fair  Rehabilitation Potential: good  Short Term Goals: To be accomplished in  3  weeks:  1. Pt will be educated in appropriate HEP to reduce imbalance and dizziness with ADLs. Status at last note/certification: n/a  Current:    2. Pt will maintain modified Romberg stance, EO >/= 10 seconds on firm surface to improve stability for dressing. Status at last note/certification: MSR 3-8\" b/l  Current:    3.  Patient will report at least 50% functional improvement with standing, walking ADL's   Status at last note/certification: n/a  Current:      Long Term Goals: To be accomplished in  6  weeks:  1. Pt will improve FOTO score to >/= 51 to demo a significant improvement in functional activity tolerance. Status at last note/certification: 44  Current:    2. Pt will achieve >/= +5 GROC in order to promote increased activity tolerance. Status at last note/certification:   Current:    3. Pt will improve DGI score >/=18/24 for decreased risk for fall with ambulation  Status at last note/certification: 15/73  Current:    4. Pt will maintain Romberg EC on foam >/=15\" for decreased risk for fall with ambulation over uneven terrain. Status at last note/certification: 4\" (few attempts 2/2 fear)  Current:        Frequency / Duration:   Patient to be seen  2  times per week for 6  weeks:  Patient / Caregiver education and instruction: self care, activity modification, and exercises  Therapist Signature: Babar Eller PT Date: 55/4/3445   Certification Period: 11/03/22 to 2/1/23 Time: 8:33 AM   ===========================================================================================  I certify that the above Physical Therapy Services are being furnished while the patient is under my care. I agree with the treatment plan and certify that this therapy is necessary. Physician Signature:        Date:       Time:        Tanna Corral MD  Please sign and return to [facility] or you may fax the signed copy to [phone]. Thank you.

## 2022-11-09 ENCOUNTER — HOSPITAL ENCOUNTER (OUTPATIENT)
Dept: PHYSICAL THERAPY | Age: 67
Discharge: HOME | End: 2022-11-09
Payer: MEDICARE

## 2022-11-09 PROCEDURE — 97112 NEUROMUSCULAR REEDUCATION: CPT

## 2022-11-09 PROCEDURE — 97530 THERAPEUTIC ACTIVITIES: CPT

## 2022-11-09 PROCEDURE — 97110 THERAPEUTIC EXERCISES: CPT

## 2022-11-09 NOTE — PROGRESS NOTES
PHYSICAL THERAPY - DAILY TREATMENT NOTE    Patient Name: Dajuan Fair        Date: 2022  : 1955   yes Patient  Verified  Visit #:   2     Insurance: Payor: Jamin Sky / Plan: [place] MEDICARE PART A & B / Product Type: Medicare /      In time: 10:01 Out time: 11:00   Total Treatment Time: 59     Medicare/BCBS Time Tracking (below)   Total Timed Codes (min):  59 1:1 Treatment Time:  39     TREATMENT AREA =  Gait instability [R26.81]  Polyarthralgia [M25.50]    SUBJECTIVE  Pain Level (on 0 to 10 scale):  0  / 10   Medication Changes/New allergies or changes in medical history, any new surgeries or procedures?    no  If yes, update Summary List   Subjective Functional Status/Changes:  []  No changes reported     \"I gotta remember that it took me a while to get here (to this level of deconditioning) so it's going to take me a while to get back in shape\"         OBJECTIVE       19  1:1  (29) min Neuromuscular Re-education:  see flow sheet   Rationale: exercises designed to improve and/or maintain balance, coordination, kinesthetic sense, posture, and/or proprioception for functional activities to improve the patients ability to function in daily life      10 1:1  (20) min Therapeutic Exercise:  see flow sheet   Rationale: increase ROM, increase strength, improve coordination, improve balance, and increase proprioception to improve the patients ability to progress to functional activities limited by pain or other dysfunction     10 1:1 min Therapeutic Activity:  see flow sheet   Rationale: to improve functional activities, model real life movements and performance specific to the patients need with supervision to return the patient to their PLOF        Billed With/As:   [] TE   [] TA   [x] Neuro   [] Self Care Patient Education: [x] Review HEP    [] Progressed/Changed HEP based on:   [] positioning   [] body mechanics   [] transfers   [] heat/ice application    [x] other: pt advised to take a walk for endurance 2 x/day. Advised to establish her baseline tolerance with a timer and use that as a target. Other Objective/Functional Measures:  Pt required frequent sitting rests for fatigue/SOB; SaO2 98%, HR= 95 bpm-124 bpm  (After side steps SaO2 92%,  bpm and pt ed on pursed lip breathing)    -new exercises added as per flow sheet with vc's provided for form/technique 100% of the time. -cues for deep breathing and light 2 finger UE support with MSR stance EO on floor; HR to 124 bpm.        Post Treatment Pain Level (on 0 to 10) scale:   0  / 10     ASSESSMENT  Assessment/Changes in Function:   Pt with significant fear of fall/anxiety related to standing balance with significant increase in HR after static standing balance. Pt provided with CP to neck and wrists and ed on deep breathing for decreased HR and decreased sympathetic nervous system activation. Pt with poor cardiovascular endurance as well contributing to need for frequent seated rests. Pt ed on her HEP and verbalized understanding. Pt instructed to perform as tolerated. []  See Progress Note/Recertification   Patient will continue to benefit from skilled PT services to modify and progress therapeutic interventions, address functional mobility deficits, address ROM deficits, address strength deficits, analyze and address soft tissue restrictions, analyze and cue movement patterns, analyze and modify body mechanics/ergonomics, assess and modify postural abnormalities, address imbalance/dizziness, and instruct in home and community integration to attain remaining goals. Progress toward goals / Updated goals:    Short Term Goals: To be accomplished in  3  weeks:  1. Pt will be educated in appropriate HEP to reduce imbalance and dizziness with ADLs. Status at last note/certification: n/a  Current:  11/9: Goal met; issued today   2.  Pt will maintain modified Romberg stance, EO >/= 10 seconds on firm surface to improve stability for dressing. Status at last note/certification: MSR 2-1\" b/l  Current:    3. Patient will report at least 50% functional improvement with standing, walking ADL's   Status at last note/certification: n/a  Current:       Long Term Goals: To be accomplished in  6  weeks:  1. Pt will improve FOTO score to >/= 51 to demo a significant improvement in functional activity tolerance. Status at last note/certification: 44  Current:    2. Pt will achieve >/= +5 GROC in order to promote increased activity tolerance. Status at last note/certification:   Current:    3. Pt will improve DGI score >/=18/24 for decreased risk for fall with ambulation  Status at last note/certification: 32/91  Current:    4. Pt will maintain Romberg EC on foam >/=15\" for decreased risk for fall with ambulation over uneven terrain. Status at last note/certification: 4\" (few attempts 2/2 fear)  Current:       PLAN  []  Upgrade activities as tolerated yes Continue plan of care   []  Discharge due to :    []  Other:      Therapist: Migel Corona.  Gene Zeng, PT    Date: 11/9/2022 Time: 12:13 PM      Future Appointments   Date Time Provider Mina Ramos   11/9/2022 10:00 AM Marlen Wheeler., PT [facility]   11/11/2022 11:30 AM Markus Jewell, PTA [facility]   11/14/2022 11:00 AM Scotty RICHARD, PT [facility]   11/16/2022 11:00 AM Renzo Bell [facility]   11/22/2022 11:30 AM Marlen Summer., PT [facility]   11/28/2022 11:00 AM Marlen Clbinta., PT [facility]   11/30/2022 11:00 AM Renzo Bell [facility]   12/5/2022 11:00 AM Marlen Summer., PT [facility]

## 2022-11-11 ENCOUNTER — APPOINTMENT (OUTPATIENT)
Dept: PHYSICAL THERAPY | Age: 67
End: 2022-11-11
Payer: MEDICARE

## 2022-11-14 ENCOUNTER — HOSPITAL ENCOUNTER (OUTPATIENT)
Dept: PHYSICAL THERAPY | Age: 67
End: 2022-11-14
Payer: MEDICARE

## 2022-11-16 ENCOUNTER — HOSPITAL ENCOUNTER (OUTPATIENT)
Dept: PHYSICAL THERAPY | Age: 67
Discharge: HOME | End: 2022-11-16
Payer: MEDICARE

## 2022-11-16 PROCEDURE — 97530 THERAPEUTIC ACTIVITIES: CPT

## 2022-11-16 PROCEDURE — 97112 NEUROMUSCULAR REEDUCATION: CPT

## 2022-11-16 PROCEDURE — 97110 THERAPEUTIC EXERCISES: CPT

## 2022-11-16 NOTE — PROGRESS NOTES
PHYSICAL THERAPY - DAILY TREATMENT NOTE    Patient Name: Kala Hidalgo        Date: 2022  : 1955   yes Patient  Verified  Visit #:   3  of     Insurance: Payor: [place] MEDICARE / Plan: VA MEDICARE PART A & B / Product Type: Medicare /      In time: 11:00 Out time: 11:54   Total Treatment Time: 54     Medicare/BCBS Time Tracking (below)   Total Timed Codes (min):  54 1:1 Treatment Time:  54     TREATMENT AREA =  Gait instability [R26.81]  Polyarthralgia [M25.50]    SUBJECTIVE  Pain Level (on 0 to 10 scale):  0  / 10   Medication Changes/New allergies or changes in medical history, any new surgeries or procedures?    no  If yes, update Summary List   Subjective Functional Status/Changes:  []  No changes reported   Pt reports she is feeling a little extra short of breath today         OBJECTIVE       12  1:1   min Neuromuscular Re-education:  see flow sheet   Rationale: exercises designed to improve and/or maintain balance, coordination, kinesthetic sense, posture, and/or proprioception for functional activities to improve the patients ability to function in daily life      32 1:1   min Therapeutic Exercise:  see flow sheet   Rationale: increase ROM, increase strength, improve coordination, improve balance, and increase proprioception to improve the patients ability to progress to functional activities limited by pain or other dysfunction     10 1:1 min Therapeutic Activity:  see flow sheet   Rationale: to improve functional activities, model real life movements and performance specific to the patients need with supervision to return the patient to their PLOF        Billed With/As:   [] TE   [] TA   [x] Neuro   [] Self Care Patient Education: [x] Review HEP    [] Progressed/Changed HEP based on:   [] positioning   [] body mechanics   [] transfers   [] heat/ice application    [x] other: educated pt in option of 360pitube debra chi for improving balance and endurance at home.         Other Objective/Functional Measures:  Pt required frequent sitting rests for fatigue/SOB; seated and standing exercises were alternated to maintain O2 sats. Baseline SpO2 today was 97%, ~80bpm, O2 at lowest was 89%, with HR of 114 bpm, Pt was verbally instructed to perform pursed lip breathing throughout tx. Post Treatment Pain Level (on 0 to 10) scale:   0  / 10     ASSESSMENT  Assessment/Changes in Function:   Pt continues to require frequent seated rest breaks between activities to recover O2 sats. Pt was educated throughout session re importance of pursed lip breathing to improve O2 sats while recovering and/or exercising. Pt had dec tolerance for standing exercises today due to deconditioning and O2 sats. Focus of tx was on sitting exercises, with interspersed standing exercises to pt tolerance. []  See Progress Note/Recertification   Patient will continue to benefit from skilled PT services to modify and progress therapeutic interventions, address functional mobility deficits, address ROM deficits, address strength deficits, analyze and address soft tissue restrictions, analyze and cue movement patterns, analyze and modify body mechanics/ergonomics, assess and modify postural abnormalities, address imbalance/dizziness, and instruct in home and community integration to attain remaining goals. Progress toward goals / Updated goals:    Short Term Goals: To be accomplished in  3  weeks:  1. Pt will be educated in appropriate HEP to reduce imbalance and dizziness with ADLs. Status at last note/certification: n/a  Current:  11/9: Goal met; issued today   2. Pt will maintain modified Romberg stance, EO >/= 10 seconds on firm surface to improve stability for dressing. Status at last note/certification: MSR 6-3\" b/l addressing with balance training 11/16/22  Current:    3.  Patient will report at least 50% functional improvement with standing, walking ADL's   Status at last note/certification: n/a  Current:       Long Term Goals: To be accomplished in  6  weeks:  1. Pt will improve FOTO score to >/= 51 to demo a significant improvement in functional activity tolerance. Status at last note/certification: 44  Current:    2. Pt will achieve >/= +5 GROC in order to promote increased activity tolerance. Status at last note/certification:   Current:    3. Pt will improve DGI score >/=18/24 for decreased risk for fall with ambulation  Status at last note/certification: 20/62  Current:    4. Pt will maintain Romberg EC on foam >/=15\" for decreased risk for fall with ambulation over uneven terrain.   Status at last note/certification: 4\" (few attempts 2/2 fear)  Current:       PLAN  []  Upgrade activities as tolerated yes Continue plan of care   []  Discharge due to :    []  Other:      Therapist: Lucina Hwang    Date: 11/16/2022 Time: 12:13 PM      Future Appointments   Date Time Provider Mina Ramos   11/16/2022 11:00 AM Donnell Nur [facility]   11/22/2022 11:30 AM Lala RICHARD, PT [facility]   11/28/2022 11:00 AM Maik Leonard, PT [facility]   11/30/2022 11:00 AM Donnell Nur [facility]   12/5/2022 11:00 AM Maik Leonard, PT [facility]

## 2022-11-21 DIAGNOSIS — F41.9 ANXIETY: ICD-10-CM

## 2022-11-21 RX ORDER — DULOXETIN HYDROCHLORIDE 30 MG/1
30 CAPSULE, DELAYED RELEASE ORAL DAILY
Qty: 90 CAPSULE | Refills: 1 | Status: SHIPPED | OUTPATIENT
Start: 2022-11-21

## 2022-11-22 ENCOUNTER — HOSPITAL ENCOUNTER (OUTPATIENT)
Dept: PHYSICAL THERAPY | Age: 67
Discharge: HOME | End: 2022-11-22
Payer: MEDICARE

## 2022-11-22 PROCEDURE — 97112 NEUROMUSCULAR REEDUCATION: CPT

## 2022-11-22 PROCEDURE — 97110 THERAPEUTIC EXERCISES: CPT

## 2022-11-22 PROCEDURE — 97530 THERAPEUTIC ACTIVITIES: CPT

## 2022-11-22 NOTE — PROGRESS NOTES
PHYSICAL THERAPY - DAILY TREATMENT NOTE    Patient Name: Leydi Guajardo        Date: 2022  : 1955   yes Patient  Verified  Visit #:   4    Insurance: Payor: Fred Gould / Plan: VA MEDICARE PART A & B / Product Type: Medicare /      In time: 11:37 Out time: 12:38   Total Treatment Time: 61     Medicare/BCBS Time Tracking (below)   Total Timed Codes (min):  61 1:1 Treatment Time:  61     TREATMENT AREA =  Gait instability [R26.81]  Polyarthralgia [M25.50]    SUBJECTIVE  Pain Level (on 0 to 10 scale):  0  / 10   Medication Changes/New allergies or changes in medical history, any new surgeries or procedures?    no  If yes, update Summary List   Subjective Functional Status/Changes:  []  No changes reported   Pt reports compliance with her HEP; \"I've even been doing some extra things\" (LAQ's)  Pt c/o \"I'm getting winded easier lately\"         OBJECTIVE       20  1:1   min Neuromuscular Re-education:  see flow sheet   Rationale: exercises designed to improve and/or maintain balance, coordination, kinesthetic sense, posture, and/or proprioception for functional activities to improve the patients ability to function in daily life    23 1:1   min Therapeutic Exercise:  see flow sheet   Rationale: increase ROM, increase strength, improve coordination, improve balance, and increase proprioception to improve the patients ability to progress to functional activities limited by pain or other dysfunction     18 1:1 min Therapeutic Activity:  see flow sheet   Rationale: to improve functional activities, model real life movements and performance specific to the patients need with supervision to return the patient to their PLOF        Billed With/As:   [] TE   [] TA   [x] Neuro   [] Self Care Patient Education: [x] Review HEP    [] Progressed/Changed HEP based on:   [] positioning   [] body mechanics   [] transfers   [] heat/ice application    [x] other: updated HEP including daily walking 2 x/day Other Objective/Functional Measures:  Vitals pre trx: SaO2 97%, HR 85 bpm, BP= 145/80  Dizzy 1-2/10  Post tx: SaO2 92%, HR 98 bpm, BP= 147/71 mmHg  Dizzy 2/10    -performed standing HR/TR's; modified TR to 1 leg staggered stance; HR up to 105 bpm, SaO2 96% afterwards and required seated rest  -pursed lip breathing encouraged throughout session. -frequent seated rests for fatigue     Post Treatment Pain Level (on 0 to 10) scale:   0  / 10     ASSESSMENT  Assessment/Changes in Function:   Pt continues to require frequent seated rests 2/2 fatigue and poor endurance. Pt was ed throughout session on pursed lip breathing to decrease SOB, however endurance challenge appears more related to anxiety and increased HR with attempts at standing balance despite having only minimal ankle strategy noted in exercises. Pt encouraged to continue with updated HEP to improve tolerance and confidence with standing activities as well as to address strength/endurance impairments. []  See Progress Note/Recertification   Patient will continue to benefit from skilled PT services to modify and progress therapeutic interventions, address functional mobility deficits, address ROM deficits, address strength deficits, analyze and address soft tissue restrictions, analyze and cue movement patterns, analyze and modify body mechanics/ergonomics, assess and modify postural abnormalities, address imbalance/dizziness, and instruct in home and community integration to attain remaining goals. Progress toward goals / Updated goals:    Short Term Goals: To be accomplished in  3  weeks:  1. Pt will be educated in appropriate HEP to reduce imbalance and dizziness with ADLs. Status at last note/certification: n/a  Current:  11/9: Goal met; issued today   2. Pt will maintain modified Romberg stance, EO >/= 10 seconds on firm surface to improve stability for dressing.   Status at last note/certification: MSR 1-4\" b/l addressing with balance training 11/16/22  Current:    3. Patient will report at least 50% functional improvement with standing, walking ADL's   Status at last note/certification: n/a  Current:  11/22: Goal not met; pt reports no change to her standing/walking tolerance as of yet     Long Term Goals: To be accomplished in  6  weeks:  1. Pt will improve FOTO score to >/= 51 to demo a significant improvement in functional activity tolerance. Status at last note/certification: 44  Current:    2. Pt will achieve >/= +5 GROC in order to promote increased activity tolerance. Status at last note/certification:   Current:    3. Pt will improve DGI score >/=18/24 for decreased risk for fall with ambulation  Status at last note/certification: 71/40  Current:    4. Pt will maintain Romberg EC on foam >/=15\" for decreased risk for fall with ambulation over uneven terrain. Status at last note/certification: 4\" (few attempts 2/2 fear)  Current:       PLAN  []  Upgrade activities as tolerated yes Continue plan of care   []  Discharge due to :    []  Other:      Therapist: Holley Silva.  Alyn Curling, PT    Date: 11/22/2022 Time: 12:38 PM      Future Appointments   Date Time Provider Mina Ramos   11/22/2022 11:30 AM Niki Osei., PT [facility]   11/28/2022 11:00 AM Niki Osei., PT [facility]   11/30/2022 11:00 AM Kiki Calloway [facility]   12/5/2022 11:00 AM Niki Osei., PT [facility]

## 2022-11-28 ENCOUNTER — HOSPITAL ENCOUNTER (OUTPATIENT)
Dept: PHYSICAL THERAPY | Age: 67
Discharge: HOME | End: 2022-11-28
Payer: MEDICARE

## 2022-11-28 PROCEDURE — 97112 NEUROMUSCULAR REEDUCATION: CPT

## 2022-11-28 PROCEDURE — 97530 THERAPEUTIC ACTIVITIES: CPT

## 2022-11-28 NOTE — PROGRESS NOTES
[facility] PHYSICAL THERAPY  [facility] [address], [place], [address] - Phone: [phone]  Fax: [phone]  [address] [place] [address] PHYSICAL THERAPY          Patient Name: Leydi Guajardo : 1955   Treatment/Medical Diagnosis: Gait instability [R26.81]  Polyarthralgia [M25.50]   Onset Date: 2018    Referral Source: Iris Lopez MD [facility]): 11/3/2022   Prior Hospitalization: See Medical History Provider #: [id number]   Prior Level of Function: Amb. Without AD; occasional use of rw for community/prolonged walking. Works part time as a ; relatively sedentary lifestyle. 1 story home with 1 RADHA. (Summer home in [place] with 4 RADHA and 2nd story)   Comorbidities: HTN, arthritis, pulmonary fibrosis, sleep apnea, L TKA , R TKA , BMI >30, cataracts   Medications: Verified on Patient Summary List   Visits from [facility]: 5 Missed Visits: 2     Short Term Goals: To be accomplished in  3  weeks:  1. Pt will be educated in appropriate HEP to reduce imbalance and dizziness with ADLs. Status at last note/certification: n/a  Current:  : Goal met; issued today   2. Pt will maintain modified Romberg stance, EO >/= 10 seconds on firm surface to improve stability for dressing. Status at last note/certification: MSR 2-4\" b/l   Current:  : Goal in progress; R 3\", L 5\"  3. Patient will report at least 50% functional improvement with standing, walking ADL's   Status at last note/certification: n/a  Current: : Goal in progress; reports 10% improvement     Long Term Goals: To be accomplished in  6  weeks:  1. Pt will improve FOTO score to >/= 51 to demo a significant improvement in functional activity tolerance. Status at last note/certification: 44  Current:  : Goal Not Met: 43  2. Pt will achieve >/= +5 GROC in order to promote increased activity tolerance.   Status at last note/certification:   Current:  : Goal in Progress: +2  3. Pt will improve DGI score >/=18/24 for decreased risk for fall with ambulation  Status at last note/certification: 42/00  Current:  11/28: Goal Met: 18/24  4. Pt will maintain Romberg EC on foam >/=15\" for decreased risk for fall with ambulation over uneven terrain. Status at last note/certification: SH=2\" (few attempts 2/2 fear); EO 30\"  Current:  11/28: Goal not met: EC 2\", EO 6\"      Key Functional Changes/Progress: Pt has participated in 5 sessions of skilled PT for dx: unsteadiness on feet. Pt has been able to demonstrate slow, steady progress. Progress has been mildly limited by decreased adherence to HEP over the holiday and generalized deconditioning/pulmonary fibrosis limiting cardiovascular endurance. Improvements: \"I feel like my balance is a little bit better\". Pt reports attempting to perform ADL's and household chores with decreased UE support carefully  Remaining functional limitations: walking (unsteady), prolonged standing, stairs    Objective measurements:  Current dizziness: 3/10 max dizziness (with activity cooking for Thanksgiving), 0/10 least/average dizziness. Current Pain: 0/10, Max pain 3-4/10, Least pain 0/10.   R knee and low back  Postural Assessment: significant b/l genu valgum    Gait Assessment: Ambulates without AD, decreased gait speed, decreased push off b/l, short, shuffled steps, widened ALEXEI  Transfers: multiple attempts for Sit to stand without UE's and significant b/l genu valgum; dizziness on standing (improved after could minutes standing)      Problem List: pain affecting function, decrease ROM, decrease strength, impaired gait/ balance, decrease ADL/ functional abilitiies, decrease activity tolerance, decrease flexibility/ joint mobility, and decrease transfer abilities     Treatment Plan may include any combination of the following: Therapeutic exercise, Neuromuscular reeducation, Manual therapy, Therapeutic activity, Self care/home management, Electric stim unattended , Gait training, Ultrasound, and Electric stim attended  Patient Goal(s) has been updated and includes:     Goals for this certification period include and are to be achieved in   4  weeks:  1. Pt will maintain modified Romberg stance, EO >/= 10 seconds on firm surface to improve stability for dressing. Status at last note/certification: 57/69: R 3\", L 5\"  Current:    2. Patient will report at least 40% functional improvement with standing, walking ADL's   Status at last note/certification: 92/18: 10% improvement  Current:    3. Pt will improve FOTO score to >/= 51 to demo a significant improvement in functional activity tolerance. Status at last note/certification: 44/01: 43  Current:    4. Pt will achieve >/= +4 GROC in order to promote increased activity tolerance. Status at last note/certification: 88/17: +2  Current:    5. Pt will maintain Romberg EC on foam >/=10\" for decreased risk for fall with ambulation over uneven terrain. Status at last note/certification: 04/95: 2\"  Current:      Frequency / Duration:   Patient to be seen   2   times per week for   4    weeks:    Assessments/Recommendations: Pt demonstrates slow progress and ongoing deficits/impairments which place her at significant risk for fall and justify need for progression of skilled PT to reduce risk of injury/morbidity and improve independence with ADL's and functional mobility  If you have any questions/comments please contact us directly at [phone]. Thank you for allowing us to assist in the care of your patient. Therapist Signature: Mitzy Mahan.  TATIANNA Eller Date: 05/42/0167   Certification Period:  Reporting Period: 11/03/22 to 2/1/23 11/03/22 to 11/28/22  Time: 7:12 AM   NOTE TO PHYSICIAN:  PLEASE COMPLETE THE ORDERS BELOW AND FAX TO   [facility] Physical [facility]: [phone]  If you are unable to process this request in 24 hours please contact our office: [phone]    ___ I have read the above report and request that my patient continue as recommended.   ___ I have read the above report and request that my patient continue therapy with the following changes/special instructions: ________________________________________________   ___ I have read the above report and request that my patient be discharged from therapy.      Physician Signature:        Date:       Time:       Silvia Saab MD

## 2022-11-28 NOTE — PROGRESS NOTES
PHYSICAL THERAPY - DAILY TREATMENT NOTE    Patient Name: Wil Founds        Date: 2022  : 1955   yes Patient  Verified  Visit #:   5    [facility] / Plan: [place] MEDICARE PART A & B / Product Type: Medicare /      In time: 11:01 Out time: 12:03   Total Treatment Time: 62     Medicare/BCBS Time Tracking (below)   Total Timed Codes (min):  62 1:1 Treatment Time:  45     TREATMENT AREA =  Gait instability [R26.81]  Polyarthralgia [M25.50]    SUBJECTIVE  Pain Level (on 0 to 10 scale):  0  / 10   Medication Changes/New allergies or changes in medical history, any new surgeries or procedures?    no  If yes, update Summary List   Subjective Functional Status/Changes:  []  No changes reported   See PN         OBJECTIVE    20  1:1   min Neuromuscular Re-education:  see flow sheet   Rationale: exercises designed to improve and/or maintain balance, coordination, kinesthetic sense, posture, and/or proprioception for functional activities to improve the patients ability to function in daily life    (17)   min Therapeutic Exercise:  see flow sheet   Rationale: increase ROM, increase strength, improve coordination, improve balance, and increase proprioception to improve the patients ability to progress to functional activities limited by pain or other dysfunction     25  1:1 min Therapeutic Activity:  see flow sheet   Rationale: to improve functional activities, model real life movements and performance specific to the patients need with supervision to return the patient to their PLOF        Billed With/As:   [] TE   [] TA   [x] Neuro   [] Self Care Patient Education: [x] Review HEP    [] Progressed/Changed HEP based on:   [] positioning   [] body mechanics   [] transfers   [] heat/ice application    [x] other:        Other Objective/Functional Measures:  See PN       Post Treatment Pain Level (on 0 to 10) scale:   0  / 10     ASSESSMENT  Assessment/Changes in Function:   See PN     []  See Progress Note/Recertification   Patient will continue to benefit from skilled PT services to modify and progress therapeutic interventions, address functional mobility deficits, address ROM deficits, address strength deficits, analyze and address soft tissue restrictions, analyze and cue movement patterns, analyze and modify body mechanics/ergonomics, assess and modify postural abnormalities, address imbalance/dizziness, and instruct in home and community integration to attain remaining goals. Progress toward goals / Updated goals:  See PN     PLAN  []  Upgrade activities as tolerated yes Continue plan of care   []  Discharge due to :    []  Other:      Therapist: Vicente Quiroz.  Antonieta Lynch, PT    Date: 11/28/2022 Time: 12:48 PM      Future Appointments   Date Time Provider Mina Ramos   11/28/2022 11:00 AM Danette Cavanaugh, PT [facility]   11/30/2022 11:00 AM Trace Ragsdale [facility]   12/5/2022 11:00 AM Danette Cavanaugh, PT [facility]

## 2022-11-30 ENCOUNTER — HOSPITAL ENCOUNTER (OUTPATIENT)
Dept: PHYSICAL THERAPY | Age: 67
Discharge: HOME | End: 2022-11-30
Payer: MEDICARE

## 2022-11-30 PROCEDURE — 97112 NEUROMUSCULAR REEDUCATION: CPT

## 2022-11-30 PROCEDURE — 97110 THERAPEUTIC EXERCISES: CPT

## 2022-11-30 PROCEDURE — 97530 THERAPEUTIC ACTIVITIES: CPT

## 2022-11-30 NOTE — PROGRESS NOTES
PHYSICAL THERAPY - DAILY TREATMENT NOTE    Patient Name: Tiarra Reagan        Date: 2022  : 1955   yes Patient  Verified  Visit #:     Insurance: Payor: Laura Moreno / Plan: [place] / Product Type: Medicare /      In time: 11:02 Out time: 12:07   Total Treatment Time: 65     Medicare/BCBS Time Tracking (below)   Total Timed Codes (min):  55 1:1 Treatment Time:  45     TREATMENT AREA =  Gait instability [R26.81]  Polyarthralgia [M25.50]    SUBJECTIVE  Pain Level (on 0 to 10 scale):  0  / 10   Medication Changes/New allergies or changes in medical history, any new surgeries or procedures?    no  If yes, update Summary List   Subjective Functional Status/Changes:  []  No changes reported   Pt reports she used 2 shots of her inhaler today,          OBJECTIVE  Modalities Rationale:     decrease inflammation and decrease pain to improve patient's ability to improve pt ability to tolerate therapeutic exercise   min [] Estim, type/location:                                      []  att     []  unatt     []  w/US     []  w/ice    []  w/heat    min []  Mechanical Traction: type/lbs                   []  pro   []  sup   []  int   []  cont    []  before manual    []  after manual    min []  Ultrasound, settings/location:      min []  Iontophoresis w/ dexamethasone, location:                                               []  take home patch       []  in clinic   10 min [x]  Ice     []  Heat    location/position:  To c spine in seated    min []  Vasopneumatic Device, press/temp:    If using vaso (only need to measure limb vaso being performed on)      pre-treatment girth :       post-treatment girth :       measured at (landmark location) :      min []  Other:    [x] Skin assessment post-treatment (if applicable):    [x]  intact    []  redness- no adverse reaction                  []redness - adverse reaction:          15  1:1   min Neuromuscular Re-education:  see flow sheet Rationale: exercises designed to improve and/or maintain balance, coordination, kinesthetic sense, posture, and/or proprioception for functional activities to improve the patients ability to function in daily life    30  (20 1:1)   min Therapeutic Exercise:  see flow sheet   Rationale: increase ROM, increase strength, improve coordination, improve balance, and increase proprioception to improve the patients ability to progress to functional activities limited by pain or other dysfunction     10  1:1 min Therapeutic Activity:  see flow sheet   Rationale: to improve functional activities, model real life movements and performance specific to the patients need with supervision to return the patient to their PLOF        Billed With/As:   [] TE   [] TA   [x] Neuro   [] Self Care Patient Education: [x] Review HEP    [] Progressed/Changed HEP based on:   [] positioning   [] body mechanics   [] transfers   [] heat/ice application    [x] other:        Other Objective/Functional Measures: Added sit to stands  Performed standing rest break 1x     Post Treatment Pain Level (on 0 to 10) scale:   0  / 10     ASSESSMENT  Assessment/Changes in Function:  Pt was able to tolerate addition of sit to stands with limited repetitions to allow for SpO2 recovery. Sit to stands were added with intention of improving Pt was able to tolerate one standing rest break with O2 levels maintained at 93% or above. Pt was also instructed in standing with head turns to improve standing dynamic balance for improved safety with gait.    CP applied to pt neck at end of session per pt request.    []  See Progress Note/Recertification   Patient will continue to benefit from skilled PT services to modify and progress therapeutic interventions, address functional mobility deficits, address ROM deficits, address strength deficits, analyze and address soft tissue restrictions, analyze and cue movement patterns, analyze and modify body mechanics/ergonomics, assess and modify postural abnormalities, address imbalance/dizziness, and instruct in home and community integration to attain remaining goals. Progress toward goals / Updated goals:  1. Pt will maintain modified Romberg stance, EO >/= 10 seconds on firm surface to improve stability for dressing. Status at last note/certification: 94/26: R 3\", L 5\"  Current:    2. Patient will report at least 40% functional improvement with standing, walking ADL's   Status at last note/certification: 27/49: 10% improvement  Current:    3. Pt will improve FOTO score to >/= 51 to demo a significant improvement in functional activity tolerance. Status at last note/certification: 89/03: 43  Current:    4. Pt will achieve >/= +4 GROC in order to promote increased activity tolerance. Status at last note/certification: 45/20: +2  Current:    5. Pt will maintain Romberg EC on foam >/=10\" for decreased risk for fall with ambulation over uneven terrain.   Status at last note/certification: 93/79: 2\"  Current:       PLAN  []  Upgrade activities as tolerated yes Continue plan of care   []  Discharge due to :    []  Other:      Therapist: Bill Bolus    Date: 11/30/2022 Time: 12:48 PM      Future Appointments   Date Time Provider Mina Ramos   12/5/2022 11:00 AM Samuel Rob, PT [facility]

## 2022-12-06 ENCOUNTER — HOSPITAL ENCOUNTER (OUTPATIENT)
Dept: PHYSICAL THERAPY | Age: 67
Discharge: HOME | End: 2022-12-06
Payer: MEDICARE

## 2022-12-06 DIAGNOSIS — M48.07 STENOSIS OF LATERAL RECESS OF LUMBOSACRAL SPINE: ICD-10-CM

## 2022-12-06 DIAGNOSIS — E78.2 MIXED HYPERLIPIDEMIA: ICD-10-CM

## 2022-12-06 DIAGNOSIS — M12.9 ARTHRITIS INVOLVING MULTIPLE SITES: ICD-10-CM

## 2022-12-06 PROCEDURE — 97530 THERAPEUTIC ACTIVITIES: CPT

## 2022-12-06 PROCEDURE — 97110 THERAPEUTIC EXERCISES: CPT

## 2022-12-06 PROCEDURE — 97112 NEUROMUSCULAR REEDUCATION: CPT

## 2022-12-06 PROCEDURE — 97535 SELF CARE MNGMENT TRAINING: CPT

## 2022-12-06 RX ORDER — SIMVASTATIN 40 MG
40 TABLET ORAL NIGHTLY
Qty: 90 TABLET | Refills: 0 | Status: SHIPPED | OUTPATIENT
Start: 2022-12-06

## 2022-12-06 RX ORDER — CELECOXIB 200 MG/1
200 CAPSULE ORAL DAILY
Qty: 90 CAPSULE | Refills: 0 | Status: SHIPPED | OUTPATIENT
Start: 2022-12-06

## 2022-12-06 NOTE — PROGRESS NOTES
PHYSICAL THERAPY - DAILY TREATMENT NOTE    Patient Name: Mili Freeman        Date: 2022  : 1955   yes Patient  Verified  Visit #:     Insurance: Payor: Pinky Counter / Plan: [place] MEDICARE PART A & B / Product Type: Medicare /      In time: 11:37 Out time: 12:40   Total Treatment Time: 63     Medicare/BCBS Time Tracking (below)   Total Timed Codes (min):  63 1:1 Treatment Time:  53     TREATMENT AREA =  Gait instability [R26.81]  Polyarthralgia [M25.50]    SUBJECTIVE  Pain Level (on 0 to 10 scale):  0  / 10   Medication Changes/New allergies or changes in medical history, any new surgeries or procedures?    no  If yes, update Summary List   Subjective Functional Status/Changes:  []  No changes reported   Pt states she took 2 shots of Albuterol before today's session; \"It seems to keep me from getting super SOB\". States she was carrying a bag with heavy items which caused her to feel off balance (especially when carrying in left hand); while she was rushing out she noticed being really off balance and SOB,. Later she was walking up an incline at a student's home and had to take a rest inside  Pt has not been implementing her daily walks for HEP; states her home environment is too cluttered at this time.        OBJECTIVE  Modalities Rationale:     decrease inflammation and decrease pain to improve patient's ability to improve pt ability to tolerate therapeutic exercise   min [] Estim, type/location:                                      []  att     []  unatt     []  w/US     []  w/ice    []  w/heat    min []  Mechanical Traction: type/lbs                   []  pro   []  sup   []  int   []  cont    []  before manual    []  after manual    min []  Ultrasound, settings/location:      min []  Iontophoresis w/ dexamethasone, location:                                               []  take home patch       []  in clinic   x min [x]  Ice     []  Heat    location/position: To c/s concurrent to treatment session    min []  Vasopneumatic Device, press/temp:    If using vaso (only need to measure limb vaso being performed on)      pre-treatment girth :       post-treatment girth :       measured at (landmark location) :      min []  Other:    [x] Skin assessment post-treatment (if applicable):    [x]  intact    []  redness- no adverse reaction                  []redness - adverse reaction:          15  1:1  (20) min Neuromuscular Re-education:  see flow sheet   Rationale: exercises designed to improve and/or maintain balance, coordination, kinesthetic sense, posture, and/or proprioception for functional activities to improve the patients ability to function in daily life    15 1:1  (20)   min Therapeutic Exercise:  see flow sheet   Rationale: increase ROM, increase strength, improve coordination, improve balance, and increase proprioception to improve the patients ability to progress to functional activities limited by pain or other dysfunction     15  1:1 min Therapeutic Activity:  see flow sheet   Rationale: to improve functional activities, model real life movements and performance specific to the patients need with supervision to return the patient to their PLOF       8   1:1 min Self Care: pt encouraged to attempt her daily walks 2 x/day; Also discussed ability to perform floor bike for cardio endurance 2 x/day to tolerance  -pt inquires about lymphedema; pt ed on lymphedema therapy and referred to our [facility] or [facility] for in    Rationale:    pt education related to ADL performance, compensatory training, safety procedures, wound care/edema management, and adaptive equipment and/or assistive technology devices to improve pt's safety and functional independence.         Billed With/As:   [] TE   [] TA   [x] Neuro   [] Self Care Patient Education: [x] Review HEP    [] Progressed/Changed HEP based on:   [] positioning   [] body mechanics   [] transfers   [] heat/ice application    [x] other:        Other Objective/Functional Measures: SaO2 98%, HR 89 at end of session    -performed static standing EO with light 1 UE support today   -increased reps ham curls; cued to keep foot off floor   -intermittent seated rests for fatigue with session     Post Treatment Pain Level (on 0 to 10) scale:   0  / 10     ASSESSMENT  Assessment/Changes in Function:  Pt allowed to maintain light UE support with static standing balance to increase her total time; she continues to have increased RR/HR noted afterwards which is likely related to apprehension. Pt encouraged on increased compliance with HEP recommendations to facilitate progression towards LTG's.     []  See Progress Note/Recertification   Patient will continue to benefit from skilled PT services to modify and progress therapeutic interventions, address functional mobility deficits, address ROM deficits, address strength deficits, analyze and address soft tissue restrictions, analyze and cue movement patterns, analyze and modify body mechanics/ergonomics, assess and modify postural abnormalities, address imbalance/dizziness, and instruct in home and community integration to attain remaining goals. Progress toward goals / Updated goals:  1. Pt will maintain modified Romberg stance, EO >/= 10 seconds on firm surface to improve stability for dressing. Status at last note/certification: 15/59: R 3\", L 5\"  Current:    2. Patient will report at least 40% functional improvement with standing, walking ADL's   Status at last note/certification: 76/70: 10% improvement  Current:    3. Pt will improve FOTO score to >/= 51 to demo a significant improvement in functional activity tolerance. Status at last note/certification: 31/67: 43  Current:    4. Pt will achieve >/= +4 GROC in order to promote increased activity tolerance. Status at last note/certification: 93/81: +2  Current:    5.  Pt will maintain Romberg EC on foam >/=10\" for decreased risk for fall with ambulation over uneven terrain. Status at last note/certification: 51/57: 2\"  Current:  12/6: Goal not met; performed with light 2 UE support today x 38\" with normal ALEXEI     PLAN  []  Upgrade activities as tolerated yes Continue plan of care   []  Discharge due to :    []  Other:      Therapist: Ronnie Colon.  Valarie Oppenheim, PT    Date: 12/6/2022 Time: 12:44 PM      Future Appointments   Date Time Provider Mina Ramos   12/6/2022 11:30 AM Mary Grace Covarrubias., PT [facility]   12/8/2022  9:15 AM Mary Grace Covarrubias., PT [facility]   12/13/2022 10:45 AM Mary Grace Covarrubias., PT [facility]   12/16/2022  9:30 AM Mary Grace Covarrubias., PT [facility]   12/21/2022 10:45 AM Mary Grace Covarrubias., PT [facility]

## 2022-12-08 ENCOUNTER — HOSPITAL ENCOUNTER (OUTPATIENT)
Dept: PHYSICAL THERAPY | Age: 67
Discharge: HOME | End: 2022-12-08
Payer: MEDICARE

## 2022-12-08 PROCEDURE — 97530 THERAPEUTIC ACTIVITIES: CPT

## 2022-12-08 PROCEDURE — 97112 NEUROMUSCULAR REEDUCATION: CPT

## 2022-12-08 PROCEDURE — 97110 THERAPEUTIC EXERCISES: CPT

## 2022-12-08 NOTE — PROGRESS NOTES
PHYSICAL THERAPY - DAILY TREATMENT NOTE    Patient Name: Maritza Rush        Date: 2022  : 1955   yes Patient  Verified  Visit #:     Insurance: Payor: Taran Blanco / Plan: [place] MEDICARE PART A & B / Product Type: Medicare /      In time: 9:20 AM Out time: 10:15   Total Treatment Time: 55     Medicare/BCBS Time Tracking (below)   Total Timed Codes (min):  55 1:1 Treatment Time:  53     TREATMENT AREA =  Gait instability [R26.81]  Polyarthralgia [M25.50]    SUBJECTIVE  Pain Level (on 0 to 10 scale):  0  / 10   Medication Changes/New allergies or changes in medical history, any new surgeries or procedures?    no  If yes, update Summary List   Subjective Functional Status/Changes:  []  No changes reported   Pt states she did her HEP last night before bed and it kept her up; she doesn't feel she got as tired as she has in the past with her HEP; , SaO2 95%.   States she did walk yesterday for ~2.5 min       OBJECTIVE  Modalities Rationale:     decrease inflammation and decrease pain to improve patient's ability to improve pt ability to tolerate therapeutic exercise   min [] Estim, type/location:                                      []  att     []  unatt     []  w/US     []  w/ice    []  w/heat    min []  Mechanical Traction: type/lbs                   []  pro   []  sup   []  int   []  cont    []  before manual    []  after manual    min []  Ultrasound, settings/location:      min []  Iontophoresis w/ dexamethasone, location:                                               []  take home patch       []  in clinic   x min [x]  Ice     []  Heat    location/position: To c/s concurrent to treatment session    min []  Vasopneumatic Device, press/temp:    If using vaso (only need to measure limb vaso being performed on)      pre-treatment girth :       post-treatment girth :       measured at (landmark location) :      min []  Other:    [x] Skin assessment post-treatment (if applicable): [x]  intact    []  redness- no adverse reaction                  []redness - adverse reaction:          28  1:1 min Neuromuscular Re-education:  see flow sheet   Rationale: exercises designed to improve and/or maintain balance, coordination, kinesthetic sense, posture, and/or proprioception for functional activities to improve the patients ability to function in daily life    15 1:1  (17) min Therapeutic Exercise:  see flow sheet   Rationale: increase ROM, increase strength, improve coordination, improve balance, and increase proprioception to improve the patients ability to progress to functional activities limited by pain or other dysfunction     10  1:1 min Therapeutic Activity:  see flow sheet  -HR, march in place, rows   Rationale: to improve functional activities, model real life movements and performance specific to the patients need with supervision to return the patient to their PLOF       Billed With/As:   [] TE   [] TA   [x] Neuro   [] Self Care Patient Education: [x] Review HEP    [] Progressed/Changed HEP based on:   [] positioning   [] body mechanics   [] transfers   [] heat/ice application    [x] other:        Other Objective/Functional Measures: SaO2 94-95%, -126 after static standing balance    -c/o L foot cramp after 7 reps HR's; stopped and performed 2 min arch roller and plantar fascia stretching with strap for relief  -added march in place for dynamic balance     Post Treatment Pain Level (on 0 to 10) scale:   0  / 10     ASSESSMENT  Assessment/Changes in Function:  Pt continues to require occasional seated rests; usually tolerating 1-2 standing exercises at a time prior to needing a seated rest; pt self reports she feels she has less fatigue with exercises today indicating improving strength/endurance. Plan to increase standing stability for dynamic walking and functional mobility as able.      []  See Progress Note/Recertification   Patient will continue to benefit from skilled PT services to modify and progress therapeutic interventions, address functional mobility deficits, address ROM deficits, address strength deficits, analyze and address soft tissue restrictions, analyze and cue movement patterns, analyze and modify body mechanics/ergonomics, assess and modify postural abnormalities, address imbalance/dizziness, and instruct in home and community integration to attain remaining goals. Progress toward goals / Updated goals:  1. Pt will maintain modified Romberg stance, EO >/= 10 seconds on firm surface to improve stability for dressing. Status at last note/certification: 26/80: R 3\", L 5\"  Current:    2. Patient will report at least 40% functional improvement with standing, walking ADL's   Status at last note/certification: 58/61: 10% improvement  Current:    3. Pt will improve FOTO score to >/= 51 to demo a significant improvement in functional activity tolerance. Status at last note/certification: 96/49: 43  Current:    4. Pt will achieve >/= +4 GROC in order to promote increased activity tolerance. Status at last note/certification: 43/28: +2  Current:    5. Pt will maintain Romberg EC on foam >/=10\" for decreased risk for fall with ambulation over uneven terrain. Status at last note/certification: 02/70: 2\"  Current:  12/6: Goal not met; performed with light 2 UE support today x 38\" with normal ALEXEI     PLAN  []  Upgrade activities as tolerated yes Continue plan of care   []  Discharge due to :    []  Other:      Therapist: Max Mercedes.  Delaney Ross PT    Date: 12/8/2022 Time: 1:00 PM      Future Appointments   Date Time Provider Mina Ramos   12/8/2022  9:15 AM Latrice Dove., PT [facility]   12/13/2022 10:45 AM Latrice Dove., PT [facility]   12/16/2022  9:30 AM Latrice Dove., PT [facility]   12/21/2022 10:45 AM Latrice Dove., PT [facility]

## 2022-12-13 ENCOUNTER — HOSPITAL ENCOUNTER (OUTPATIENT)
Dept: PHYSICAL THERAPY | Age: 67
Discharge: HOME | End: 2022-12-13
Payer: MEDICARE

## 2022-12-13 PROCEDURE — 97530 THERAPEUTIC ACTIVITIES: CPT

## 2022-12-13 PROCEDURE — 97110 THERAPEUTIC EXERCISES: CPT

## 2022-12-13 PROCEDURE — 97116 GAIT TRAINING THERAPY: CPT

## 2022-12-13 PROCEDURE — 97112 NEUROMUSCULAR REEDUCATION: CPT

## 2022-12-13 NOTE — PROGRESS NOTES
PHYSICAL THERAPY - DAILY TREATMENT NOTE    Patient Name: Brian Adjutant        Date: 2022  : 1955   yes Patient  Verified  Visit #:     Insurance: Payor: Brina Stevenson / Plan: [place] & B / Product Type: Medicare /      In time: 10:50 Out time: 11:48   Total Treatment Time: 58     Medicare/BCBS Time Tracking (below)   Total Timed Codes (min):  58 1:1 Treatment Time:  58     TREATMENT AREA =  Gait instability [R26.81]  Polyarthralgia [M25.50]    SUBJECTIVE  Pain Level (on 0 to 10 scale):  0  / 10   Medication Changes/New allergies or changes in medical history, any new surgeries or procedures?    no  If yes, update Summary List   Subjective Functional Status/Changes:  []  No changes reported   Pt states she was carrying xmas presents from one room to another; \"It was ok as long as they weren't too heavy\". States she did have some left foot cramping with standing HR's during her HEP  Pt states she is walking in home, but doesn't time it; \"I feel my HR go up after ~3 min\".         OBJECTIVE  Modalities Rationale:     decrease inflammation and decrease pain to improve patient's ability to improve pt ability to tolerate therapeutic exercise   min [] Estim, type/location:                                      []  att     []  unatt     []  w/US     []  w/ice    []  w/heat    min []  Mechanical Traction: type/lbs                   []  pro   []  sup   []  int   []  cont    []  before manual    []  after manual    min []  Ultrasound, settings/location:      min []  Iontophoresis w/ dexamethasone, location:                                               []  take home patch       []  in clinic    min [x]  Ice     []  Heat    location/position: To c/s concurrent to treatment session    min []  Vasopneumatic Device, press/temp:    If using vaso (only need to measure limb vaso being performed on)      pre-treatment girth :       post-treatment girth :       measured at (landmark location) :      min []  Other:    [x] Skin assessment post-treatment (if applicable):    [x]  intact    []  redness- no adverse reaction                  []redness - adverse reaction:          13  1:1 min Neuromuscular Re-education:  see flow sheet   Rationale: exercises designed to improve and/or maintain balance, coordination, kinesthetic sense, posture, and/or proprioception for functional activities to improve the patients ability to function in daily life    22  1:1 min Therapeutic Exercise:  see flow sheet   Rationale: increase ROM, increase strength, improve coordination, improve balance, and increase proprioception to improve the patients ability to progress to functional activities limited by pain or other dysfunction     15 1:1 min Therapeutic Activity:  see flow sheet  -HR, march in place, rows   Rationale: to improve functional activities, model real life movements and performance specific to the patients need with supervision to return the patient to their PLOF     8 1:1 min Gait Training:    [x] March  (in place)          [] Lateral                  [] Horizontal HT  [] Tandem         [x] Banded Lateral     [] Vertical HT  [] Retrograde    [] Banded Monster   [] Dual Task  [x] Hurdles-3 small hurdles in // bars with 1 UE, reciprocal x 2 laps          [] Step ups        [] Ladder Drills  [] Heel Walk      [] Toe Walk   Rationale: facilitate gait in all varieties including dynamic movements to improved gait quality in the home and community      Billed With/As:   [] TE   [] TA   [x] Neuro   [] Self Care Patient Education: [x] Review HEP    [] Progressed/Changed HEP based on:   [] positioning   [] body mechanics   [] transfers   [] heat/ice application    [x] other:        Other Objective/Functional Measures:    -performs standing 3 way hip x 2.5 min prior to needing seated rest break;  SaO2 91-92%, -94 bpm; seated rest x 5 min then able to continue  -HR's with ~70/30% wb'ing R/L  -added hurdles in // bars, band side steps, strap gastroc stretching  -cues for 3\" iso hold in LAQ     Post Treatment Pain Level (on 0 to 10) scale:   0  / 10     ASSESSMENT  Assessment/Changes in Function:  Pt with ongoing limited endurance requiring intermittent seated rests. Pt encouraged for increased ambulation in home 2 x/day for endurance. She does demonstrate slow improvement with LE strength and stability as per ability to perform more gait training activities today. Discussed plan to progress towards long term HEP over next 2 sessions then likely d/c to HEP. []  See Progress Note/Recertification   Patient will continue to benefit from skilled PT services to modify and progress therapeutic interventions, address functional mobility deficits, address ROM deficits, address strength deficits, analyze and address soft tissue restrictions, analyze and cue movement patterns, analyze and modify body mechanics/ergonomics, assess and modify postural abnormalities, address imbalance/dizziness, and instruct in home and community integration to attain remaining goals. Progress toward goals / Updated goals:  1. Pt will maintain modified Romberg stance, EO >/= 10 seconds on firm surface to improve stability for dressing. Status at last note/certification: 93/07: R 3\", L 5\"  Current:    2. Patient will report at least 40% functional improvement with standing, walking ADL's   Status at last note/certification: 27/05: 10% improvement  Current: 12/13: Goal in progress; 10-15% improvement   3. Pt will improve FOTO score to >/= 51 to demo a significant improvement in functional activity tolerance. Status at last note/certification: 53/85: 43  Current:    4. Pt will achieve >/= +4 GROC in order to promote increased activity tolerance. Status at last note/certification: 87/44: +2  Current:    5. Pt will maintain Romberg EC on foam >/=10\" for decreased risk for fall with ambulation over uneven terrain.   Status at last note/certification: 83/43: 2\"  Current:  12/6: Goal not met; performed with light 2 UE support today x 38\" with normal ALEXEI     PLAN  []  Upgrade activities as tolerated yes Continue plan of care   []  Discharge due to :    []  Other:      Therapist: Jacques hZao.  Lilli David, PT    Date: 12/13/2022 Time: 11:47 AM      Future Appointments   Date Time Provider Mina Ramos   12/16/2022  9:30 AM Marek Valverde, PT [facility]   12/21/2022 10:45 AM Marek Valverde, PT [facility]

## 2022-12-16 ENCOUNTER — HOSPITAL ENCOUNTER (OUTPATIENT)
Dept: PHYSICAL THERAPY | Age: 67
Discharge: HOME | End: 2022-12-16
Payer: MEDICARE

## 2022-12-16 PROCEDURE — 97112 NEUROMUSCULAR REEDUCATION: CPT

## 2022-12-16 PROCEDURE — 97116 GAIT TRAINING THERAPY: CPT

## 2022-12-16 PROCEDURE — 97530 THERAPEUTIC ACTIVITIES: CPT

## 2022-12-16 PROCEDURE — 97110 THERAPEUTIC EXERCISES: CPT

## 2022-12-16 NOTE — PROGRESS NOTES
PHYSICAL THERAPY - DAILY TREATMENT NOTE    Patient Name: Lady Lynn        Date: 2022  : 1955   yes Patient  Verified  Visit #:   10    Insurance: Payor: Anne Linda / Plan: [place] MEDICARE PART A & B / Product Type: Medicare /      In time: 9:35 Out time: 10:28   Total Treatment Time: 53     Medicare/BCBS Time Tracking (below)   Total Timed Codes (min):  53 1:1 Treatment Time:  53     TREATMENT AREA =  Gait instability [R26.81]  Polyarthralgia [M25.50]    SUBJECTIVE  Pain Level (on 0 to 10 scale):  3  / 10   Medication Changes/New allergies or changes in medical history, any new surgeries or procedures?    no  If yes, update Summary List   Subjective Functional Status/Changes:  []  No changes reported   Pt states she had her COVID booster a couple days ago and is feeling very fatigued. She has had left thigh pain and R knee pain since her last session; low level.        OBJECTIVE  Modalities Rationale:     decrease inflammation and decrease pain to improve patient's ability to improve pt ability to tolerate therapeutic exercise   min [] Estim, type/location:                                      []  att     []  unatt     []  w/US     []  w/ice    []  w/heat    min []  Mechanical Traction: type/lbs                   []  pro   []  sup   []  int   []  cont    []  before manual    []  after manual    min []  Ultrasound, settings/location:      min []  Iontophoresis w/ dexamethasone, location:                                               []  take home patch       []  in clinic    min [x]  Ice     []  Heat    location/position: To c/s concurrent to treatment session    min []  Vasopneumatic Device, press/temp:    If using vaso (only need to measure limb vaso being performed on)      pre-treatment girth :       post-treatment girth :       measured at (landmark location) :      min []  Other:    [x] Skin assessment post-treatment (if applicable):    [x]  intact    []  redness- no adverse reaction                  []redness - adverse reaction:          15  1:1 min Neuromuscular Re-education:  see flow sheet   Rationale: exercises designed to improve and/or maintain balance, coordination, kinesthetic sense, posture, and/or proprioception for functional activities to improve the patients ability to function in daily life    20  1:1 min Therapeutic Exercise:  see flow sheet   Rationale: increase ROM, increase strength, improve coordination, improve balance, and increase proprioception to improve the patients ability to progress to functional activities limited by pain or other dysfunction     10 1:1 min Therapeutic Activity:  see flow sheet   Rationale: to improve functional activities, model real life movements and performance specific to the patients need with supervision to return the patient to their PLOF     8  1:1 min Gait Training:    [] March  (in place)          [] Lateral                  [] Horizontal HT  [] Tandem         [x] Banded Lateral     [] Vertical HT  [] Retrograde    [] Banded Monster   [] Dual Task  [] Hurdles-3 small hurdles in // bars with 1 UE, reciprocal x 2 laps          [] Step ups        [] Ladder Drills  [] Heel Walk      [] Toe Walk    -suitcase carry 28' R/L with 3 lb; 5 lb R/L x 75' each   Rationale: facilitate gait in all varieties including dynamic movements to improved gait quality in the home and community      Billed With/As:   [] TE   [] TA   [x] Neuro   [] Self Care Patient Education: [x] Review HEP    [] Progressed/Changed HEP based on:   [] positioning   [] body mechanics   [] transfers   [] heat/ice application    [x] other: pt ed on DOMS and recommended gentle stretching/movement to promote circulation and resolution of soreness  -updated HEP       Other Objective/Functional Measures:    -added suitcase carry to progress with functional community ambulation; , SaO2 90% afterwards; recovers to 95 bpm, 95% SaO2 after ~ 2 min seated rest     Post Treatment Pain Level (on 0 to 10) scale:   3  / 10     ASSESSMENT  Assessment/Changes in Function:  Pt was ed on need for consistent HEP performance to improve cardio-vascular function for daily tasks and ADL's. Pt was moderately challenged with addition of suitcase carry. Continues to require occasional rests due to endurance. Reviewed plan for d/c at [place].     []  See Progress Note/Recertification   Patient will continue to benefit from skilled PT services to modify and progress therapeutic interventions, address functional mobility deficits, address ROM deficits, address strength deficits, analyze and address soft tissue restrictions, analyze and cue movement patterns, analyze and modify body mechanics/ergonomics, assess and modify postural abnormalities, address imbalance/dizziness, and instruct in home and community integration to attain remaining goals. Progress toward goals / Updated goals:  1. Pt will maintain modified Romberg stance, EO >/= 10 seconds on firm surface to improve stability for dressing. Status at last note/certification: 65/47: R 3\", L 5\"  Current:    2. Patient will report at least 40% functional improvement with standing, walking ADL's   Status at last note/certification: 84/20: 10% improvement  Current: 12/13: Goal in progress; 10-15% improvement   3. Pt will improve FOTO score to >/= 51 to demo a significant improvement in functional activity tolerance. Status at last note/certification: 19/18: 43  Current:    4. Pt will achieve >/= +4 GROC in order to promote increased activity tolerance. Status at last note/certification: 28/68: +2  Current:    5. Pt will maintain Romberg EC on foam >/=10\" for decreased risk for fall with ambulation over uneven terrain.   Status at last note/certification: 72/92: 2\"  Current:  12/6: Goal not met; performed with light 2 UE support today x 38\" with normal ALEXEI     PLAN  []  Upgrade activities as tolerated yes Continue plan of care   []  Discharge due to :    []  Other:      Therapist: Jacques Zhao.  Lilli David, PT    Date: 12/16/2022 Time: 10:26 AM      Future Appointments   Date Time Provider Mina Ramos   12/16/2022  9:30 AM Marek Valverde, PT [facility]   12/21/2022 10:45 AM Marek Valverde, PT [facility]

## 2022-12-21 ENCOUNTER — HOSPITAL ENCOUNTER (OUTPATIENT)
Dept: PHYSICAL THERAPY | Age: 67
Discharge: HOME | End: 2022-12-21
Payer: MEDICARE

## 2022-12-21 PROCEDURE — 97530 THERAPEUTIC ACTIVITIES: CPT

## 2022-12-21 PROCEDURE — 97112 NEUROMUSCULAR REEDUCATION: CPT

## 2022-12-21 PROCEDURE — 97110 THERAPEUTIC EXERCISES: CPT

## 2022-12-21 NOTE — PROGRESS NOTES
[address] [place] [address] PHYSICAL THERAPY  [address], [place], [address] - Phone: [phone]  Fax: [phone]  [facility] PHYSICAL THERAPY          Patient Name: Tamara Collier : 1955   Treatment/Medical Diagnosis: Gait instability [R26.81]  Polyarthralgia [M25.50]   Onset Date: 2018    Referral Source: Keisha Su MD [facility]): 11/3/2022   Prior Hospitalization: See Medical History Provider #: [id number]   Prior Level of Function: Amb. Without AD; occasional use of rw for community/prolonged walking. Works part time as a ; relatively sedentary lifestyle. 1 story home with 1 RADHA. (Summer home in [place] with 4 RADHA and 2nd story)   Comorbidities: HTN, arthritis, pulmonary fibrosis, sleep apnea, L TKA , R TKA , BMI >30, cataracts   Medications: Verified on Patient Summary List   Visits from [facility]: 11 Missed Visits: 3   Long Term Goals: To be accomplished in  6  weeks:  1. Pt will maintain modified Romberg stance, EO >/= 10 seconds on firm surface to improve stability for dressing. Status at last note/certification: 74: R 3\", L 5\"  Current: : Goal Met; R/L 30\" (c/o cramping to b/l calves)   2. Patient will report at least 40% functional improvement with standing, walking ADL's   Status at last note/certification: 58: 10% improvement  Current: : Goal in progress; 10-15% improvement   3. Pt will improve FOTO score to >/= 51 to demo a significant improvement in functional activity tolerance. Status at last note/certification: : 43  Current:  : Goal not Met:  45  4. Pt will achieve >/= +4 GROC in order to promote increased activity tolerance. Status at last note/certification: 4036: +2  Current:  : Goal Not Met: +2    5. Pt will maintain Romberg EC on foam >/=10\" for decreased risk for fall with ambulation over uneven terrain.   Status at last note/certification: : 2\"  Current:  : Goal not met; performed with light 2 UE support today x 38\" with normal ALEXEI. 12/21: Goal not met; 30\" EC foam with 1-2 UE support      Key Functional Changes/Progress: Pt has participated in 11 sessions of skilled PT for dx: unsteadiness on feet. Pt reports 20% overall improvement in functional activity tolerance and balance since SOC. Improvements:  Pt reports she was able to walk to her son's graduation over the weekend \"and I didn't get that out of breath; I thought I would be much worse; I didn't need my rw\". Improved standing balance  Remaining functional limitations: walking (unsteady and decreased endurance), prolonged standing, stairs    Objective measurements:  Postural Assessment: significant b/l genu valgum, midfoot pronation  Gait Assessment: Ambulates without AD, decreased gait speed, decreased push off b/l, short, shuffled steps, widened ALEXEI,   GROC +2. FOTO 45      Assessments/Recommendations: Discontinue therapy due to lack of appreciable progress towards goals. Pt has been educated on long term HEP for self management. If you have any questions/comments please contact us directly at [phone]. Thank you for allowing us to assist in the care of your patient. Therapist Signature: Migel Corona.  TATIANNA Eller Date: 12/21/22    Reporting Period: 11/3/22 to 12/21/22  Time: 2:40 PM

## 2022-12-21 NOTE — PROGRESS NOTES
Physical Therapy Discharge Instructions  [address]  [address], [place] [address]  P: [phone] F: [phone]    Patient: Jay Melgoza  : 1955    Reason for Discharge From PT:  []Met/progressing towards all set goals    []Minimal progress made towards set goals    [x]Met a plateau in progress/improvement      Recommendations:   [x] Return to activity with home program as prescribed on print-outs    [] Return to activity with the following modifications:          Continue with      [x] Ice [] Heat   as needed for 10-15 minutes to relieve pain  *If pain does not improve after several days, follow-up with your physician for a consult*           Follow up with MD:     [] Upon completion of therapy   [x] As needed      Additional Comments: It has been a pleasure working with you!! Thank you for choosing [facility] [place] for your PT needs! Marcy Eller, PT 2022 10:56 AM

## 2022-12-21 NOTE — PROGRESS NOTES
PHYSICAL THERAPY - DAILY TREATMENT NOTE    Patient Name: Ortiz Bernal        Date: 2022  : 1955   yes Patient  Verified  Visit #:     Insurance: Payor: Henry Traore / Plan: [place] MEDICARE PART A & B / Product Type: Medicare /      In time: 10:49 Out time: 11:52   Total Treatment Time: 63     Medicare/BCBS Time Tracking (below)   Total Timed Codes (min):  63 1:1 Treatment Time:  45     TREATMENT AREA =  Gait instability [R26.81]  Polyarthralgia [M25.50]    SUBJECTIVE  Pain Level (on 0 to 10 scale):  0  / 10   Medication Changes/New allergies or changes in medical history, any new surgeries or procedures?    no  If yes, update Summary List   Subjective Functional Status/Changes:  []  No changes reported   See d/c       OBJECTIVE  Modalities Rationale:     decrease inflammation and decrease pain to improve patient's ability to improve pt ability to tolerate therapeutic exercise   min [] Estim, type/location:                                      []  att     []  unatt     []  w/US     []  w/ice    []  w/heat    min []  Mechanical Traction: type/lbs                   []  pro   []  sup   []  int   []  cont    []  before manual    []  after manual    min []  Ultrasound, settings/location:      min []  Iontophoresis w/ dexamethasone, location:                                               []  take home patch       []  in clinic    min [x]  Ice     []  Heat    location/position: To c/s concurrent to treatment session    min []  Vasopneumatic Device, press/temp:    If using vaso (only need to measure limb vaso being performed on)      pre-treatment girth :       post-treatment girth :       measured at (landmark location) :      min []  Other:    [x] Skin assessment post-treatment (if applicable):    [x]  intact    []  redness- no adverse reaction                  []redness - adverse reaction:          10  1:1 min Neuromuscular Re-education:  see flow sheet   Rationale: exercises designed to improve and/or maintain balance, coordination, kinesthetic sense, posture, and/or proprioception for functional activities to improve the patients ability to function in daily life    10  1:1  (28) min Therapeutic Exercise:  see flow sheet   Rationale: increase ROM, increase strength, improve coordination, improve balance, and increase proprioception to improve the patients ability to progress to functional activities limited by pain or other dysfunction     15 1:1 min Therapeutic Activity:  see flow sheet   Rationale: to improve functional activities, model real life movements and performance specific to the patients need with supervision to return the patient to their PLOF     10  1:1 min Gait Training:    [] March  (in place)          [] Lateral                  [] Horizontal HT  [] Tandem         [x] Banded Lateral     [] Vertical HT  [] Retrograde    [] Banded Monster   [] Dual Task  [] Hurdles-3 small hurdles in // bars with 1 UE, reciprocal x 2 laps          [] Step ups        [] Ladder Drills  [] Heel Walk      [] Toe Walk    -suitcase carry 5 lb R/L x 75' each   Rationale: facilitate gait in all varieties including dynamic movements to improved gait quality in the home and community      Billed With/As:   [] TE   [] TA   [x] Neuro   [] Self Care Patient Education: [x] Review HEP    [] Progressed/Changed HEP based on:   [] positioning   [] body mechanics   [] transfers   [] heat/ice application    [x] other:        Other Objective/Functional Measures:  -issued and reviewed finalized HEP     Post Treatment Pain Level (on 0 to 10) scale:   0  / 10     ASSESSMENT  Assessment/Changes in Function:  See d/c           Progress toward goals / Updated goals:  See d/c     PLAN  []  Upgrade activities as tolerated no Continue plan of care   []  Discharge due to :    []  Other:      Therapist: Padmini Johnson, PT    Date: 12/21/2022 Time: 12:01 PM      Future Appointments   Date Time Provider Mina Ramos 12/21/2022 10:45 AM Ethel Eldridge., PT [facility]

## 2023-03-08 DIAGNOSIS — E78.2 MIXED HYPERLIPIDEMIA: ICD-10-CM

## 2023-03-08 DIAGNOSIS — M48.07 STENOSIS OF LATERAL RECESS OF LUMBOSACRAL SPINE: ICD-10-CM

## 2023-03-08 DIAGNOSIS — M12.9 ARTHRITIS INVOLVING MULTIPLE SITES: ICD-10-CM

## 2023-03-08 RX ORDER — CELECOXIB 200 MG/1
200 CAPSULE ORAL DAILY
Qty: 90 CAPSULE | Refills: 0 | OUTPATIENT
Start: 2023-03-08

## 2023-03-08 RX ORDER — SIMVASTATIN 40 MG
TABLET ORAL
Qty: 90 TABLET | Refills: 0 | OUTPATIENT
Start: 2023-03-08

## 2023-05-16 DIAGNOSIS — F41.9 ANXIETY: ICD-10-CM

## 2023-05-17 RX ORDER — DULOXETIN HYDROCHLORIDE 30 MG/1
30 CAPSULE, DELAYED RELEASE ORAL DAILY
Qty: 90 CAPSULE | Refills: 1 | OUTPATIENT
Start: 2023-05-17

## 2023-07-25 ENCOUNTER — TELEPHONE (OUTPATIENT)
Dept: FAMILY MEDICINE | Age: 68
End: 2023-07-25

## 2023-10-31 RX ORDER — DICYCLOMINE HCL 20 MG
TABLET ORAL
Qty: 120 TABLET | Refills: 2 | OUTPATIENT
Start: 2023-10-31